# Patient Record
Sex: MALE | Race: WHITE | NOT HISPANIC OR LATINO | Employment: UNEMPLOYED | URBAN - NONMETROPOLITAN AREA
[De-identification: names, ages, dates, MRNs, and addresses within clinical notes are randomized per-mention and may not be internally consistent; named-entity substitution may affect disease eponyms.]

---

## 2017-01-25 ENCOUNTER — APPOINTMENT (OUTPATIENT)
Dept: GENERAL RADIOLOGY | Facility: HOSPITAL | Age: 52
End: 2017-01-25

## 2017-01-25 ENCOUNTER — HOSPITAL ENCOUNTER (EMERGENCY)
Facility: HOSPITAL | Age: 52
Discharge: HOME | End: 2017-01-26
Attending: INTERNAL MEDICINE | Admitting: INTERNAL MEDICINE

## 2017-01-25 DIAGNOSIS — R07.9 CHEST PAIN, UNSPECIFIED TYPE: Primary | ICD-10-CM

## 2017-01-25 PROCEDURE — 99284 EMERGENCY DEPT VISIT MOD MDM: CPT

## 2017-01-25 PROCEDURE — 93005 ELECTROCARDIOGRAM TRACING: CPT | Performed by: INTERNAL MEDICINE

## 2017-01-25 PROCEDURE — 71010 HC CHEST PA OR AP: CPT

## 2017-01-25 PROCEDURE — 71010 XR CHEST 1 VW: CPT | Performed by: RADIOLOGY

## 2017-01-25 PROCEDURE — 93010 ELECTROCARDIOGRAM REPORT: CPT | Performed by: INTERNAL MEDICINE

## 2017-01-25 RX ORDER — SODIUM CHLORIDE 0.9 % (FLUSH) 0.9 %
10 SYRINGE (ML) INJECTION AS NEEDED
Status: DISCONTINUED | OUTPATIENT
Start: 2017-01-25 | End: 2017-01-26 | Stop reason: HOSPADM

## 2017-01-25 RX ORDER — ATORVASTATIN CALCIUM 40 MG/1
80 TABLET, FILM COATED ORAL ONCE
Status: COMPLETED | OUTPATIENT
Start: 2017-01-25 | End: 2017-01-25

## 2017-01-25 RX ORDER — ASPIRIN 325 MG
325 TABLET ORAL ONCE
Status: COMPLETED | OUTPATIENT
Start: 2017-01-25 | End: 2017-01-25

## 2017-01-25 RX ORDER — ASPIRIN 81 MG/1
324 TABLET, CHEWABLE ORAL ONCE
Status: DISCONTINUED | OUTPATIENT
Start: 2017-01-25 | End: 2017-01-26 | Stop reason: HOSPADM

## 2017-01-25 RX ADMIN — ATORVASTATIN CALCIUM 80 MG: 40 TABLET, FILM COATED ORAL at 23:59

## 2017-01-25 RX ADMIN — Medication 325 MG: at 23:59

## 2017-01-26 VITALS
BODY MASS INDEX: 27.82 KG/M2 | RESPIRATION RATE: 18 BRPM | DIASTOLIC BLOOD PRESSURE: 78 MMHG | SYSTOLIC BLOOD PRESSURE: 118 MMHG | HEIGHT: 63 IN | HEART RATE: 89 BPM | WEIGHT: 157 LBS | OXYGEN SATURATION: 99 % | TEMPERATURE: 98.1 F

## 2017-01-26 LAB
ALBUMIN SERPL-MCNC: 4.1 G/DL (ref 3.5–5)
ALBUMIN/GLOB SERPL: 1.2 G/DL (ref 1.5–2.5)
ALP SERPL-CCNC: 96 U/L (ref 46–116)
ALT SERPL W P-5'-P-CCNC: 31 U/L (ref 10–44)
ANION GAP SERPL CALCULATED.3IONS-SCNC: 8.9 MMOL/L (ref 3.6–11.2)
AST SERPL-CCNC: 21 U/L (ref 10–34)
BASOPHILS # BLD AUTO: 0.03 10*3/MM3 (ref 0–0.3)
BASOPHILS NFR BLD AUTO: 0.3 % (ref 0–2)
BILIRUB SERPL-MCNC: 0.3 MG/DL (ref 0.2–1.8)
BUN BLD-MCNC: 14 MG/DL (ref 7–21)
BUN/CREAT SERPL: 10 (ref 7–25)
CALCIUM SPEC-SCNC: 9.2 MG/DL (ref 7.7–10)
CHLORIDE SERPL-SCNC: 106 MMOL/L (ref 99–112)
CO2 SERPL-SCNC: 26.1 MMOL/L (ref 24.3–31.9)
CREAT BLD-MCNC: 1.4 MG/DL (ref 0.43–1.29)
DEPRECATED RDW RBC AUTO: 40.7 FL (ref 37–54)
EOSINOPHIL # BLD AUTO: 0.09 10*3/MM3 (ref 0–0.7)
EOSINOPHIL NFR BLD AUTO: 1 % (ref 0–5)
ERYTHROCYTE [DISTWIDTH] IN BLOOD BY AUTOMATED COUNT: 12.1 % (ref 11.5–14.5)
GFR SERPL CREATININE-BSD FRML MDRD: 54 ML/MIN/1.73
GLOBULIN UR ELPH-MCNC: 3.3 GM/DL
GLUCOSE BLD-MCNC: 112 MG/DL (ref 70–110)
HCT VFR BLD AUTO: 42.7 % (ref 42–52)
HGB BLD-MCNC: 14 G/DL (ref 14–18)
HOLD SPECIMEN: NORMAL
IMM GRANULOCYTES # BLD: 0.01 10*3/MM3 (ref 0–0.03)
IMM GRANULOCYTES NFR BLD: 0.1 % (ref 0–0.5)
INR PPP: 0.92 (ref 0.8–1.1)
LYMPHOCYTES # BLD AUTO: 2.4 10*3/MM3 (ref 1–3)
LYMPHOCYTES NFR BLD AUTO: 26.8 % (ref 21–51)
MCH RBC QN AUTO: 30.9 PG (ref 27–33)
MCHC RBC AUTO-ENTMCNC: 32.8 G/DL (ref 33–37)
MCV RBC AUTO: 94.3 FL (ref 80–94)
MONOCYTES # BLD AUTO: 0.9 10*3/MM3 (ref 0.1–0.9)
MONOCYTES NFR BLD AUTO: 10 % (ref 0–10)
NEUTROPHILS # BLD AUTO: 5.53 10*3/MM3 (ref 1.4–6.5)
NEUTROPHILS NFR BLD AUTO: 61.8 % (ref 30–70)
OSMOLALITY SERPL CALC.SUM OF ELEC: 282.5 MOSM/KG (ref 273–305)
PLATELET # BLD AUTO: 249 10*3/MM3 (ref 130–400)
PMV BLD AUTO: 10.1 FL (ref 6–10)
POTASSIUM BLD-SCNC: 3.8 MMOL/L (ref 3.5–5.3)
PROT SERPL-MCNC: 7.4 G/DL (ref 6–8)
PROTHROMBIN TIME: 10.4 SECONDS (ref 9.8–11.9)
RBC # BLD AUTO: 4.53 10*6/MM3 (ref 4.7–6.1)
SODIUM BLD-SCNC: 141 MMOL/L (ref 135–153)
TROPONIN I SERPL-MCNC: <0.006 NG/ML
TROPONIN I SERPL-MCNC: <0.006 NG/ML
WBC NRBC COR # BLD: 8.96 10*3/MM3 (ref 4.5–12.5)
WHOLE BLOOD HOLD SPECIMEN: NORMAL
WHOLE BLOOD HOLD SPECIMEN: NORMAL

## 2017-01-26 PROCEDURE — 84484 ASSAY OF TROPONIN QUANT: CPT | Performed by: INTERNAL MEDICINE

## 2017-01-26 PROCEDURE — 85025 COMPLETE CBC W/AUTO DIFF WBC: CPT | Performed by: INTERNAL MEDICINE

## 2017-01-26 PROCEDURE — 36415 COLL VENOUS BLD VENIPUNCTURE: CPT

## 2017-01-26 PROCEDURE — 96360 HYDRATION IV INFUSION INIT: CPT

## 2017-01-26 PROCEDURE — 80053 COMPREHEN METABOLIC PANEL: CPT | Performed by: INTERNAL MEDICINE

## 2017-01-26 PROCEDURE — 85610 PROTHROMBIN TIME: CPT | Performed by: INTERNAL MEDICINE

## 2017-01-26 RX ORDER — ATORVASTATIN CALCIUM 20 MG/1
20 TABLET, FILM COATED ORAL DAILY
COMMUNITY
End: 2017-04-26 | Stop reason: SDUPTHER

## 2017-01-26 RX ORDER — ASPIRIN 81 MG/1
81 TABLET, CHEWABLE ORAL DAILY
COMMUNITY
End: 2020-02-24

## 2017-01-26 RX ORDER — LISINOPRIL 5 MG/1
10 TABLET ORAL DAILY
COMMUNITY
End: 2017-04-26 | Stop reason: SDUPTHER

## 2017-01-26 RX ORDER — OMEPRAZOLE 40 MG/1
40 CAPSULE, DELAYED RELEASE ORAL DAILY
COMMUNITY

## 2017-01-26 RX ADMIN — SODIUM CHLORIDE 1000 ML: 9 INJECTION, SOLUTION INTRAVENOUS at 03:39

## 2017-01-26 RX ADMIN — Medication 10 ML: at 03:42

## 2017-01-26 RX ADMIN — Medication 10 ML: at 00:23

## 2017-01-26 NOTE — DISCHARGE INSTRUCTIONS
Mr. Morin you underwent an extensive evaluation for symptomatic coronary disease back in August.  Your evaluation today was reassuring, and your EKG showed no evidence of heart injury or lack of blood supply.  I have consulted Dr. Sorto cardiologist and he feels that you can follow-up with Dr. Richardson, and you should call and make an appointment to be seen within the next week.  If you have recurrent or worsening symptoms please follow-up in the emergency department in the interim.

## 2017-01-26 NOTE — ED PROVIDER NOTES
Subjective   HPI Comments: This is a 81-year-old male he reports a history of known coronary disease.  He reports 2 previous myocardial infarctions.  He reports no intervention has been done to date.  He was medically managed.  Most recent MI he reports was in October of last year at [facility] in [place].  Her currently attempting to obtain records with regards to this admission.    Even so the patient approximately one hour before presentation began to experience left anterior chest pressure that was severe 10 out of 10, consistent with previous anginal symptoms.  The pain persisted until the patient was given a nitroglycerin which terminated the pain the patient has not had recurrent chest pain at this time.    Patient is a 51 y.o. male presenting with chest pain.   History provided by:  Police and patient   used: No    Chest Pain   Pain location:  L lateral chest  Pain quality: pressure    Pain radiates to:  Does not radiate  Pain severity:  Severe  Onset quality:  Gradual  Duration:  15 minutes  Timing:  Constant  Associated symptoms: no cough, no fever and no palpitations        Review of Systems   Constitutional: Negative for chills and fever.   Respiratory: Positive for chest tightness. Negative for apnea and cough.    Cardiovascular: Positive for chest pain. Negative for palpitations and leg swelling.   Endocrine: Negative for polydipsia.   Genitourinary: Negative for difficulty urinating.   Neurological: Negative for light-headedness.       Past Medical History   Diagnosis Date   • Myocardial infarction        Allergies   Allergen Reactions   • Bee Venom        History reviewed. No pertinent past surgical history.    History reviewed. No pertinent family history.    Social History     Social History   • Marital status:      Spouse name: N/A   • Number of children: N/A   • Years of education: N/A     Social History Main Topics   • Smoking status: Current Every Day Smoker      Packs/day: 1.00   • Smokeless tobacco: None   • Alcohol use No   • Drug use: No   • Sexual activity: Defer     Other Topics Concern   • None     Social History Narrative   • None           Objective   Physical Exam   Constitutional: He appears well-developed.   HENT:   Head: Normocephalic.   Eyes: Pupils are equal, round, and reactive to light.   Neck: Normal range of motion.   Cardiovascular: Normal rate.    Pulmonary/Chest: Effort normal.   Abdominal: Soft. Bowel sounds are normal.   Neurological: He is alert.   Psychiatric: He has a normal mood and affect.   Nursing note and vitals reviewed.      Procedures         ED Course  ED Course   Value Comment By Time    The EKG at 2321 hrs. this night shows a normal sinus mechanism with out ST-T wave changes.  There are no diagnostic changes on this EKG. Ramez Santiago MD 01/25 0621    Medical records from [facility] from an admission in August reveal an admission for chest pain.  The patient was ruled out for acute MI by serial biomarkers.  He underwent a treadmill exercise test which was low risk.  The patient was offered aggressive risk factor modification and dismissed.  It appears that the patient has not had recurrent chest pain since that time frame. Ramez Santiago MD 01/26 0029    Strategy will be to attempt a four-hour rule out for the patient and talk to the patient's cardiologist. Ramez Santiago MD 01/26 0105   Creatinine: (!) 1.40 (Reviewed) Ramez Santiago MD 01/26 0321   Creatinine: (!) 1.40 (Reviewed) Ramez Santiago MD 01/26 0321    I had the opportunity to discuss the case with Dr. Sorto, I related the fact that the patient had a nuclear medicine stress test which I have a copy of in front of me which was low risk factor the patient had no EKG changes his head to negative troponins.  Dr. Sorto feels that A and acute coronary syndrome is unlikely and is low risk.  He feels the patient can follow back up with his cardiologist of record within  the next 7-10 days. Ramez Santiago MD 01/26 0419                  [facility]    Final diagnoses:   Chest pain, unspecified type            Ramez Santiago MD  01/26/17 0424

## 2017-02-13 ENCOUNTER — HOSPITAL ENCOUNTER (EMERGENCY)
Facility: HOSPITAL | Age: 52
Discharge: HOME | End: 2017-02-13
Admitting: FAMILY MEDICINE

## 2017-02-13 ENCOUNTER — APPOINTMENT (OUTPATIENT)
Dept: GENERAL RADIOLOGY | Facility: HOSPITAL | Age: 52
End: 2017-02-13

## 2017-02-13 VITALS
DIASTOLIC BLOOD PRESSURE: 69 MMHG | HEART RATE: 75 BPM | SYSTOLIC BLOOD PRESSURE: 109 MMHG | HEIGHT: 63 IN | OXYGEN SATURATION: 98 % | WEIGHT: 153 LBS | RESPIRATION RATE: 18 BRPM | BODY MASS INDEX: 27.11 KG/M2 | TEMPERATURE: 97.8 F

## 2017-02-13 DIAGNOSIS — R07.9 CHEST PAIN, UNSPECIFIED TYPE: Primary | ICD-10-CM

## 2017-02-13 LAB
ALBUMIN SERPL-MCNC: 4.2 G/DL (ref 3.5–5)
ALBUMIN/GLOB SERPL: 1.3 G/DL (ref 1.5–2.5)
ALP SERPL-CCNC: 99 U/L (ref 46–116)
ALT SERPL W P-5'-P-CCNC: 34 U/L (ref 10–44)
ANION GAP SERPL CALCULATED.3IONS-SCNC: 6.8 MMOL/L (ref 3.6–11.2)
AST SERPL-CCNC: 24 U/L (ref 10–34)
BASOPHILS # BLD AUTO: 0.03 10*3/MM3 (ref 0–0.3)
BASOPHILS NFR BLD AUTO: 0.4 % (ref 0–2)
BILIRUB SERPL-MCNC: 0.3 MG/DL (ref 0.2–1.8)
BUN BLD-MCNC: 17 MG/DL (ref 7–21)
BUN/CREAT SERPL: 13 (ref 7–25)
CALCIUM SPEC-SCNC: 9.5 MG/DL (ref 7.7–10)
CHLORIDE SERPL-SCNC: 105 MMOL/L (ref 99–112)
CO2 SERPL-SCNC: 27.2 MMOL/L (ref 24.3–31.9)
CREAT BLD-MCNC: 1.31 MG/DL (ref 0.43–1.29)
DEPRECATED RDW RBC AUTO: 39.7 FL (ref 37–54)
EOSINOPHIL # BLD AUTO: 0.14 10*3/MM3 (ref 0–0.7)
EOSINOPHIL NFR BLD AUTO: 1.7 % (ref 0–5)
ERYTHROCYTE [DISTWIDTH] IN BLOOD BY AUTOMATED COUNT: 12.1 % (ref 11.5–14.5)
ETHANOL BLD-MCNC: <10 MG/DL
ETHANOL UR QL: <0.01 %
GFR SERPL CREATININE-BSD FRML MDRD: 58 ML/MIN/1.73
GLOBULIN UR ELPH-MCNC: 3.3 GM/DL
GLUCOSE BLD-MCNC: 99 MG/DL (ref 70–110)
HCT VFR BLD AUTO: 40.9 % (ref 42–52)
HGB BLD-MCNC: 13.9 G/DL (ref 14–18)
IMM GRANULOCYTES # BLD: 0.02 10*3/MM3 (ref 0–0.03)
IMM GRANULOCYTES NFR BLD: 0.2 % (ref 0–0.5)
LYMPHOCYTES # BLD AUTO: 3.12 10*3/MM3 (ref 1–3)
LYMPHOCYTES NFR BLD AUTO: 38.2 % (ref 21–51)
MCH RBC QN AUTO: 31.2 PG (ref 27–33)
MCHC RBC AUTO-ENTMCNC: 34 G/DL (ref 33–37)
MCV RBC AUTO: 91.7 FL (ref 80–94)
MONOCYTES # BLD AUTO: 1.08 10*3/MM3 (ref 0.1–0.9)
MONOCYTES NFR BLD AUTO: 13.2 % (ref 0–10)
NEUTROPHILS # BLD AUTO: 3.77 10*3/MM3 (ref 1.4–6.5)
NEUTROPHILS NFR BLD AUTO: 46.3 % (ref 30–70)
OSMOLALITY SERPL CALC.SUM OF ELEC: 279.1 MOSM/KG (ref 273–305)
PLATELET # BLD AUTO: 220 10*3/MM3 (ref 130–400)
PMV BLD AUTO: 9.9 FL (ref 6–10)
POTASSIUM BLD-SCNC: 3.8 MMOL/L (ref 3.5–5.3)
PROT SERPL-MCNC: 7.5 G/DL (ref 6–8)
RBC # BLD AUTO: 4.46 10*6/MM3 (ref 4.7–6.1)
SODIUM BLD-SCNC: 139 MMOL/L (ref 135–153)
TROPONIN I SERPL-MCNC: <0.006 NG/ML
TROPONIN I SERPL-MCNC: <0.006 NG/ML
WBC NRBC COR # BLD: 8.16 10*3/MM3 (ref 4.5–12.5)

## 2017-02-13 PROCEDURE — 36415 COLL VENOUS BLD VENIPUNCTURE: CPT

## 2017-02-13 PROCEDURE — 80307 DRUG TEST PRSMV CHEM ANLYZR: CPT | Performed by: NURSE PRACTITIONER

## 2017-02-13 PROCEDURE — 71010 XR CHEST 1 VW: CPT | Performed by: RADIOLOGY

## 2017-02-13 PROCEDURE — 96360 HYDRATION IV INFUSION INIT: CPT

## 2017-02-13 PROCEDURE — 85025 COMPLETE CBC W/AUTO DIFF WBC: CPT | Performed by: NURSE PRACTITIONER

## 2017-02-13 PROCEDURE — 99284 EMERGENCY DEPT VISIT MOD MDM: CPT

## 2017-02-13 PROCEDURE — 84484 ASSAY OF TROPONIN QUANT: CPT | Performed by: NURSE PRACTITIONER

## 2017-02-13 PROCEDURE — 71010 HC CHEST PA OR AP: CPT

## 2017-02-13 PROCEDURE — 93010 ELECTROCARDIOGRAM REPORT: CPT | Performed by: INTERNAL MEDICINE

## 2017-02-13 PROCEDURE — 93005 ELECTROCARDIOGRAM TRACING: CPT | Performed by: NURSE PRACTITIONER

## 2017-02-13 PROCEDURE — 80053 COMPREHEN METABOLIC PANEL: CPT | Performed by: NURSE PRACTITIONER

## 2017-02-13 RX ORDER — SODIUM CHLORIDE 0.9 % (FLUSH) 0.9 %
10 SYRINGE (ML) INJECTION AS NEEDED
Status: DISCONTINUED | OUTPATIENT
Start: 2017-02-13 | End: 2017-02-13 | Stop reason: HOSPADM

## 2017-02-13 RX ORDER — ASPIRIN 81 MG/1
324 TABLET, CHEWABLE ORAL ONCE
Status: COMPLETED | OUTPATIENT
Start: 2017-02-13 | End: 2017-02-13

## 2017-02-13 RX ADMIN — SODIUM CHLORIDE 1000 ML: 9 INJECTION, SOLUTION INTRAVENOUS at 02:05

## 2017-02-13 RX ADMIN — ASPIRIN 324 MG: 81 TABLET, CHEWABLE ORAL at 01:10

## 2017-02-13 NOTE — ED PROVIDER NOTES
Subjective   Patient is a 51 y.o. male presenting with chest pain.   History provided by:  Patient   used: No    Chest Pain   Pain location:  R chest  Pain quality: aching and stabbing    Pain radiates to:  Does not radiate  Pain severity:  Moderate  Onset quality:  Sudden  Duration:  2 hours  Progression:  Partially resolved  Chronicity:  New  Context: at rest    Context: not breathing, not drug use, not intercourse, not lifting, not stress and not trauma    Relieved by:  Nothing  Worsened by:  Nothing  Ineffective treatments:  None tried  Associated symptoms: no abdominal pain, no AICD problem, no altered mental status, no anorexia, no anxiety, no back pain, no claudication, no dizziness, no dysphagia, no fatigue, no fever, no headache, no numbness, no orthopnea, no palpitations and no PND    Risk factors: coronary artery disease, high cholesterol, hypertension, male sex and smoking        Review of Systems   Constitutional: Negative.  Negative for fatigue and fever.   HENT: Negative.  Negative for trouble swallowing.    Eyes: Negative.    Respiratory: Negative.    Cardiovascular: Positive for chest pain. Negative for palpitations, orthopnea, claudication and PND.   Gastrointestinal: Negative.  Negative for abdominal pain and anorexia.   Endocrine: Negative.    Genitourinary: Negative.    Musculoskeletal: Negative.  Negative for back pain.   Skin: Negative.    Allergic/Immunologic: Negative.    Neurological: Negative for dizziness, numbness and headaches.   Hematological: Negative.    Psychiatric/Behavioral: Negative.    All other systems reviewed and are negative.      Past Medical History   Diagnosis Date   • Myocardial infarction        Allergies   Allergen Reactions   • Bee Venom        History reviewed. No pertinent past surgical history.    History reviewed. No pertinent family history.    Social History     Social History   • Marital status:      Spouse name: N/A   • Number of  children: N/A   • Years of education: N/A     Social History Main Topics   • Smoking status: Current Every Day Smoker     Packs/day: 1.00   • Smokeless tobacco: None   • Alcohol use No   • Drug use: No   • Sexual activity: Defer     Other Topics Concern   • None     Social History Narrative           Objective   Physical Exam   Constitutional: He is oriented to person, place, and time. He appears well-developed and well-nourished.   HENT:   Head: Normocephalic and atraumatic.   Nose: Nose normal.   Mouth/Throat: Oropharynx is clear and moist.   Eyes: EOM are normal. Pupils are equal, round, and reactive to light.   Neck: Normal range of motion.   Cardiovascular: Normal rate, regular rhythm, normal heart sounds and intact distal pulses.    Pulmonary/Chest: Effort normal and breath sounds normal.   Abdominal: Soft. Bowel sounds are normal.   Musculoskeletal: Normal range of motion.   Neurological: He is alert and oriented to person, place, and time.   Skin: Skin is warm and dry.   Psychiatric: He has a normal mood and affect. His behavior is normal. Judgment and thought content normal.   Nursing note and vitals reviewed.      Procedures         ED Course  ED Course   Value Comment By Time   XR Chest 1 View No acute cardiopulmonary disease Radha De La Rosa, APRN 02/13 0328    Patient reports having a negative cardiac stress test in October 2016.  Radha De La Rosa, APRN 02/13 0343            HEART Score  History: Moderately suspicious (+1)  ECG: Normal (+0)  Age: 45 through 65 (+1)  Risk Factors: 3 or more risk factors OR history of atherosclerotic disease (+2)  Troponin: Normal limit or lower (+0)  Total: 4         MDM  Number of Diagnoses or Management Options  Chest pain, unspecified type: new and requires workup     Amount and/or Complexity of Data Reviewed  Clinical lab tests: ordered and reviewed  Tests in the radiology section of CPT®: reviewed and ordered  Tests in the medicine section of CPT®:  reviewed and ordered  Decide to obtain previous medical records or to obtain history from someone other than the patient: yes    Risk of Complications, Morbidity, and/or Mortality  Presenting problems: moderate  Diagnostic procedures: moderate  Management options: moderate    Patient Progress  Patient progress: improved      Final diagnoses:   Chest pain, unspecified type            Radha De La Rosa, APRN  02/13/17 0344

## 2017-03-21 ENCOUNTER — OFFICE VISIT (OUTPATIENT)
Dept: CARDIOLOGY | Facility: CLINIC | Age: 52
End: 2017-03-21

## 2017-03-21 VITALS
WEIGHT: 151 LBS | RESPIRATION RATE: 16 BRPM | HEART RATE: 82 BPM | DIASTOLIC BLOOD PRESSURE: 76 MMHG | HEIGHT: 63 IN | OXYGEN SATURATION: 95 % | BODY MASS INDEX: 26.75 KG/M2 | SYSTOLIC BLOOD PRESSURE: 103 MMHG

## 2017-03-21 DIAGNOSIS — E78.5 DYSLIPIDEMIA: ICD-10-CM

## 2017-03-21 DIAGNOSIS — R07.2 PRECORDIAL PAIN: Primary | ICD-10-CM

## 2017-03-21 DIAGNOSIS — I10 ESSENTIAL HYPERTENSION: ICD-10-CM

## 2017-03-21 PROCEDURE — 99204 OFFICE O/P NEW MOD 45 MIN: CPT | Performed by: INTERNAL MEDICINE

## 2017-03-21 PROCEDURE — 93000 ELECTROCARDIOGRAM COMPLETE: CPT | Performed by: INTERNAL MEDICINE

## 2017-03-21 NOTE — PROGRESS NOTES
Vincent Arzola DO  Asif Morin  : 1965  DATE:2017    Patient Active Problem List   Diagnosis   • Essential hypertension   • Dyslipidemia       Dear Vincent Arzola DO:    Subjective     Asif Morin is a 51 y.o. male with the above medical problems who is being seen for consultation today at the request of Vincent Arzola DO.  According to the patient he has been having chest pain for the last few weeks.  He says the chest pain usually located over his left chest, both sharp and oppressive in nature and radiates down his left arm.  He states the pain lasts 5-10 minutes.  He denies any associated symptoms.  He states is worsened by nothing but relieved by nitroglycerin.  He states he also can be both during stress and rest.  He states the intensity is 9/10 on the pain scale.  The patient has an extensive history of smoking but quit smoking 20 years ago.  He continues to chew tobacco.  He denies any alcohol or illicit drug use.  He is also complaining of dyspnea but denies any palpitations, orthopnea, PND, lower extremity edema, syncope or dizziness.  He has a history of COPD due to his history of tobacco use.      Past Medical History   Diagnosis Date   • COPD (chronic obstructive pulmonary disease)    • Hyperlipidemia    • Hypertension    • Myocardial infarction    • Stroke        Past Surgical History   Procedure Laterality Date   • Skin cancer excision       left elbow       Family History   Problem Relation Age of Onset   • No Known Problems Mother    • No Known Problems Father        Social History     Social History   • Marital status:      Spouse name: N/A   • Number of children: N/A   • Years of education: N/A     Occupational History   • Not on file.     Social History Main Topics   • Smoking status: Current Every Day Smoker     Packs/day: 1.00   • Smokeless tobacco: Not on file   • Alcohol use No   • Drug use: No   • Sexual activity: Defer     Other  Topics Concern   • Not on file     Social History Narrative         Current Outpatient Prescriptions:   •  aspirin 81 MG chewable tablet, Chew 81 mg Daily., Disp: , Rfl:   •  atorvastatin (LIPITOR) 20 MG tablet, Take 20 mg by mouth Daily., Disp: , Rfl:   •  cyclobenzaprine (FLEXERIL) 10 MG tablet, Take 1 tablet by mouth 3 (three) times a day as needed for muscle spasms., Disp: 20 tablet, Rfl: 0  •  ibuprofen (ADVIL,MOTRIN) 800 MG tablet, Take 1 tablet by mouth every 6 (six) hours as needed for mild pain (1-3)., Disp: 30 tablet, Rfl: 0  •  lisinopril (PRINIVIL,ZESTRIL) 5 MG tablet, Take 10 mg by mouth Daily., Disp: , Rfl:   •  omeprazole (priLOSEC) 20 MG capsule, Take 20 mg by mouth Daily., Disp: , Rfl:     The following portions of the patient's history were reviewed and updated as appropriate: allergies, current medications, past family history, past medical history, past social history, past surgical history and problem list.    Review of Systems   Constitution: Negative for chills, fever and malaise/fatigue.   HENT: Positive for nosebleeds and sore throat. Negative for congestion and headaches.    Eyes: Positive for blurred vision. Negative for double vision, pain and redness.        Wears readers only     Cardiovascular: Positive for chest pain (left sided , dull ache x 2 wks.  comes and goes) and dyspnea on exertion. Negative for leg swelling, near-syncope, orthopnea, palpitations, paroxysmal nocturnal dyspnea and syncope.   Respiratory: Positive for cough and shortness of breath. Negative for hemoptysis and wheezing.    Endocrine: Negative for cold intolerance and heat intolerance.   Hematologic/Lymphatic: Bruises/bleeds easily (bleed).   Skin: Positive for dry skin. Negative for rash.   Musculoskeletal: Positive for arthritis, back pain, joint pain (bilat elbows), myalgias and stiffness.   Gastrointestinal: Negative for abdominal pain, change in bowel habit, constipation, diarrhea, heartburn, hematemesis,  "hematochezia, melena, nausea and vomiting.   Genitourinary: Negative for dysuria and frequency.   Neurological: Positive for numbness (bilat legs). Negative for dizziness.   Psychiatric/Behavioral: Positive for depression. The patient is nervous/anxious.        Objective   Blood pressure 103/76, pulse 82, resp. rate 16, height 63\" (160 cm), weight 151 lb (68.5 kg), SpO2 95 %.    Physical Exam   Constitutional: He is oriented to person, place, and time. He appears well-developed and well-nourished.   WM sitting comfortably in chair. He is currently in half-way and was brought in chairs   HENT:   Mouth/Throat: Oropharynx is clear and moist.   Eyes: EOM are normal. Pupils are equal, round, and reactive to light.   Neck: Neck supple. No JVD present. No tracheal deviation present. No thyromegaly present.   Cardiovascular: Normal rate, regular rhythm, S1 normal and S2 normal.  Exam reveals no gallop and no friction rub.    No murmur heard.  Pulmonary/Chest: Effort normal and breath sounds normal. No respiratory distress. He has no wheezes. He has no rales.   Abdominal: Soft. Bowel sounds are normal. He exhibits no mass. There is no tenderness.   Musculoskeletal: Normal range of motion. He exhibits no edema.   Lymphadenopathy:     He has no cervical adenopathy.   Neurological: He is alert and oriented to person, place, and time.   Skin: Skin is warm and dry. No rash noted.   Psychiatric: He has a normal mood and affect.         ECG 12 Lead  Date/Time: 3/21/2017 10:10 AM  Performed by: FRANCOISE MACHADO  Authorized by: FRANCOISE MACHADO   Comparison: compared with previous ECG from 2/13/2017  Similar to previous ECG  Rhythm: sinus rhythm  Rate: normal  BPM: 76  Conduction: conduction normal  ST Segments: ST segments normal  T Waves: T waves normal  QRS axis: normal  Other: no other findings  Clinical impression: normal ECG            Assessment/Plan      1.  Chest pain: Patient with chest pain that is " concerning for coronary artery disease.  He has multiple risk factors for this condition including age, sex, hypertension, dyslipidemia and tobacco use.  At this point will need to evaluate further with stress test.  According to the patient is unable to walk on a treadmill for 10 minutes due to knee and back pain.  As point we'll request a nuclear stress test.  We'll also request an echocardiogram to evaluate LV function and wall motion.  We'll need a lipid panel to evaluate for dyslipidemia.    2.  Hypertension: Patient with a history of hypertension that appears to be well-controlled on current regimen.  At this point will continue.  The patient's renal function appears abnormal.  Will need to monitor closely and consider switching lisinopril for an alternative medication.    3.  Dyslipidemia: Patient with history of dyslipidemia currently on atorvastatin.  We will need to check lipid panel.       Diagnosis Plan   1. Precordial pain  ECG 12 Lead    Stress Test With Myocardial Perfusion    Adult Transthoracic Echo Complete    Lipid Panel   2. Essential hypertension     3. Dyslipidemia              Return in about 1 month (around 4/21/2017).    I appreciate the opportunity to participate in this patient's cardiovascular care.    Best Regards    Ozzy Brooks

## 2017-03-28 ENCOUNTER — HOSPITAL ENCOUNTER (OUTPATIENT)
Dept: CARDIOLOGY | Facility: HOSPITAL | Age: 52
Discharge: HOME | End: 2017-03-28
Attending: INTERNAL MEDICINE | Admitting: INTERNAL MEDICINE

## 2017-03-28 DIAGNOSIS — R07.2 PRECORDIAL PAIN: ICD-10-CM

## 2017-03-28 PROCEDURE — 93306 TTE W/DOPPLER COMPLETE: CPT | Performed by: INTERNAL MEDICINE

## 2017-03-28 PROCEDURE — 93306 TTE W/DOPPLER COMPLETE: CPT

## 2017-03-30 LAB
BH CV ECHO MEAS - % IVS THICK: 52.6 %
BH CV ECHO MEAS - % LVPW THICK: 68.4 %
BH CV ECHO MEAS - ACS: 1.7 CM
BH CV ECHO MEAS - AO ROOT AREA (BSA CORRECTED): 1.6
BH CV ECHO MEAS - AO ROOT AREA: 5.9 CM^2
BH CV ECHO MEAS - AO ROOT DIAM: 2.7 CM
BH CV ECHO MEAS - BSA(HAYCOCK): 1.8 M^2
BH CV ECHO MEAS - BSA: 1.7 M^2
BH CV ECHO MEAS - BZI_BMI: 26.7 KILOGRAMS/M^2
BH CV ECHO MEAS - BZI_METRIC_HEIGHT: 160 CM
BH CV ECHO MEAS - BZI_METRIC_WEIGHT: 68.5 KG
BH CV ECHO MEAS - CONTRAST EF 4CH: 65.9 ML/M^2
BH CV ECHO MEAS - EDV(CUBED): 120.6 ML
BH CV ECHO MEAS - EDV(MOD-SP4): 44 ML
BH CV ECHO MEAS - EDV(TEICH): 115 ML
BH CV ECHO MEAS - EF(CUBED): 70.4 %
BH CV ECHO MEAS - EF(MOD-SP4): 65.9 %
BH CV ECHO MEAS - EF(TEICH): 61.8 %
BH CV ECHO MEAS - ESV(CUBED): 35.7 ML
BH CV ECHO MEAS - ESV(MOD-SP4): 15 ML
BH CV ECHO MEAS - ESV(TEICH): 43.9 ML
BH CV ECHO MEAS - FS: 33.3 %
BH CV ECHO MEAS - IVS/LVPW: 1
BH CV ECHO MEAS - IVSD: 0.68 CM
BH CV ECHO MEAS - IVSS: 1 CM
BH CV ECHO MEAS - LA DIMENSION: 2.8 CM
BH CV ECHO MEAS - LA/AO: 1
BH CV ECHO MEAS - LV DIASTOLIC VOL/BSA (35-75): 25.6 ML/M^2
BH CV ECHO MEAS - LV MASS(C)D: 108.4 GRAMS
BH CV ECHO MEAS - LV MASS(C)DI: 63.2 GRAMS/M^2
BH CV ECHO MEAS - LV MASS(C)S: 107.6 GRAMS
BH CV ECHO MEAS - LV MASS(C)SI: 62.7 GRAMS/M^2
BH CV ECHO MEAS - LV SYSTOLIC VOL/BSA (12-30): 8.7 ML/M^2
BH CV ECHO MEAS - LVIDD: 4.9 CM
BH CV ECHO MEAS - LVIDS: 3.3 CM
BH CV ECHO MEAS - LVLD AP4: 7.1 CM
BH CV ECHO MEAS - LVLS AP4: 5.6 CM
BH CV ECHO MEAS - LVOT AREA (M): 2.3 CM^2
BH CV ECHO MEAS - LVOT AREA: 2.2 CM^2
BH CV ECHO MEAS - LVOT DIAM: 1.7 CM
BH CV ECHO MEAS - LVPWD: 0.68 CM
BH CV ECHO MEAS - LVPWS: 1.1 CM
BH CV ECHO MEAS - MV A MAX VEL: 53.3 CM/SEC
BH CV ECHO MEAS - MV E MAX VEL: 50.3 CM/SEC
BH CV ECHO MEAS - MV E/A: 0.94
BH CV ECHO MEAS - PA ACC SLOPE: 1566 CM/SEC^2
BH CV ECHO MEAS - PA ACC TIME: 0.06 SEC
BH CV ECHO MEAS - PA PR(ACCEL): 53.6 MMHG
BH CV ECHO MEAS - RAP SYSTOLE: 10 MMHG
BH CV ECHO MEAS - RVDD: 1.1 CM
BH CV ECHO MEAS - RVSP: 27 MMHG
BH CV ECHO MEAS - SI(CUBED): 49.5 ML/M^2
BH CV ECHO MEAS - SI(MOD-SP4): 16.9 ML/M^2
BH CV ECHO MEAS - SI(TEICH): 41.4 ML/M^2
BH CV ECHO MEAS - SV(CUBED): 84.9 ML
BH CV ECHO MEAS - SV(MOD-SP4): 29 ML
BH CV ECHO MEAS - SV(TEICH): 71.1 ML
BH CV ECHO MEAS - TR MAX VEL: 206.3 CM/SEC
LV EF 2D ECHO EST: 65 %

## 2017-04-26 RX ORDER — ATORVASTATIN CALCIUM 20 MG/1
20 TABLET, FILM COATED ORAL DAILY
Qty: 30 TABLET | Refills: 2 | Status: SHIPPED | OUTPATIENT
Start: 2017-04-26 | End: 2019-12-05

## 2017-04-26 RX ORDER — LISINOPRIL 5 MG/1
10 TABLET ORAL DAILY
Qty: 30 TABLET | Refills: 2 | Status: SHIPPED | OUTPATIENT
Start: 2017-04-26 | End: 2019-12-05 | Stop reason: ALTCHOICE

## 2017-05-01 ENCOUNTER — APPOINTMENT (OUTPATIENT)
Dept: NUCLEAR MEDICINE | Facility: HOSPITAL | Age: 52
End: 2017-05-01
Attending: INTERNAL MEDICINE

## 2017-05-01 ENCOUNTER — APPOINTMENT (OUTPATIENT)
Dept: CARDIOLOGY | Facility: HOSPITAL | Age: 52
End: 2017-05-01
Attending: INTERNAL MEDICINE

## 2017-05-12 ENCOUNTER — HOSPITAL ENCOUNTER (OUTPATIENT)
Dept: NUCLEAR MEDICINE | Facility: HOSPITAL | Age: 52
Discharge: HOME | End: 2017-05-12
Attending: INTERNAL MEDICINE

## 2017-05-12 ENCOUNTER — HOSPITAL ENCOUNTER (OUTPATIENT)
Dept: CARDIOLOGY | Facility: HOSPITAL | Age: 52
Discharge: HOME | End: 2017-05-12
Attending: INTERNAL MEDICINE

## 2017-05-12 VITALS — BODY MASS INDEX: 27.11 KG/M2 | HEIGHT: 63 IN | WEIGHT: 153 LBS

## 2017-05-12 DIAGNOSIS — R07.2 PRECORDIAL PAIN: ICD-10-CM

## 2017-05-12 LAB
BH CV NUCLEAR PRIOR STUDY: 3
BH CV STRESS BP STAGE 1: NORMAL
BH CV STRESS COMMENTS STAGE 1: NORMAL
BH CV STRESS DOSE REGADENOSON STAGE 1: 0.4
BH CV STRESS DURATION MIN STAGE 1: 0
BH CV STRESS DURATION SEC STAGE 1: 15
BH CV STRESS HR STAGE 1: 110
BH CV STRESS PROTOCOL 1: NORMAL
BH CV STRESS RECOVERY BP: NORMAL MMHG
BH CV STRESS RECOVERY HR: 58 BPM
BH CV STRESS STAGE 1: 1
LV EF NUC BP: 80 %
MAXIMAL PREDICTED HEART RATE: 170 BPM
PERCENT MAX PREDICTED HR: 64.71 %
STRESS BASELINE BP: NORMAL MMHG
STRESS BASELINE HR: 52 BPM
STRESS PERCENT HR: 76 %
STRESS POST PEAK BP: NORMAL MMHG
STRESS POST PEAK HR: 110 BPM
STRESS TARGET HR: 145 BPM

## 2017-05-12 PROCEDURE — 78452 HT MUSCLE IMAGE SPECT MULT: CPT | Performed by: INTERNAL MEDICINE

## 2017-05-12 PROCEDURE — 25010000002 REGADENOSON 0.4 MG/5ML SOLUTION: Performed by: INTERNAL MEDICINE

## 2017-05-12 PROCEDURE — 78451 HT MUSCLE IMAGE SPECT SING: CPT

## 2017-05-12 PROCEDURE — 93018 CV STRESS TEST I&R ONLY: CPT | Performed by: INTERNAL MEDICINE

## 2017-05-12 PROCEDURE — A9500 TC99M SESTAMIBI: HCPCS | Performed by: INTERNAL MEDICINE

## 2017-05-12 PROCEDURE — 93017 CV STRESS TEST TRACING ONLY: CPT

## 2017-05-12 PROCEDURE — 0 TECHNETIUM SESTAMIBI: Performed by: INTERNAL MEDICINE

## 2017-05-12 PROCEDURE — 25010000002 AMINOPHYLLINE PER 250 MG: Performed by: INTERNAL MEDICINE

## 2017-05-12 RX ORDER — AMINOPHYLLINE DIHYDRATE 25 MG/ML
125 INJECTION, SOLUTION INTRAVENOUS
Status: COMPLETED | OUTPATIENT
Start: 2017-05-12 | End: 2017-05-12

## 2017-05-12 RX ADMIN — Medication 1 DOSE: at 09:15

## 2017-05-12 RX ADMIN — AMINOPHYLLINE 125 MG: 25 INJECTION, SOLUTION INTRAVENOUS at 11:00

## 2017-05-12 RX ADMIN — REGADENOSON 0.4 MG: 0.08 INJECTION, SOLUTION INTRAVENOUS at 10:56

## 2017-05-12 RX ADMIN — Medication 1 DOSE: at 10:56

## 2018-02-01 ENCOUNTER — OFFICE VISIT (OUTPATIENT)
Dept: RETAIL CLINIC | Facility: CLINIC | Age: 53
End: 2018-02-01

## 2018-02-01 VITALS
TEMPERATURE: 98.5 F | SYSTOLIC BLOOD PRESSURE: 120 MMHG | HEIGHT: 63 IN | OXYGEN SATURATION: 97 % | WEIGHT: 168.4 LBS | BODY MASS INDEX: 29.84 KG/M2 | DIASTOLIC BLOOD PRESSURE: 78 MMHG | HEART RATE: 70 BPM | RESPIRATION RATE: 18 BRPM

## 2018-02-01 DIAGNOSIS — J02.9 SORE THROAT: Primary | ICD-10-CM

## 2018-02-01 LAB
EXPIRATION DATE: NORMAL
INTERNAL CONTROL: NORMAL
Lab: NORMAL
S PYO AG THROAT QL: NEGATIVE

## 2018-02-01 PROCEDURE — 99212 OFFICE O/P EST SF 10 MIN: CPT | Performed by: NURSE PRACTITIONER

## 2018-02-01 PROCEDURE — 87880 STREP A ASSAY W/OPTIC: CPT | Performed by: NURSE PRACTITIONER

## 2018-02-01 NOTE — PATIENT INSTRUCTIONS
Sore Throat  When you have a sore throat, your throat may:  · Hurt.  · Burn.  · Feel irritated.  · Feel scratchy.  Many things can cause a sore throat, including:  · An infection.  · Allergies.  · Dryness in the air.  · Smoke or pollution.  · Gastroesophageal reflux disease (GERD).  · A tumor.  A sore throat can be the first sign of another sickness. It can happen with other problems, like coughing or a fever. Most sore throats go away without treatment.  Follow these instructions at home:  · Take over-the-counter medicines only as told by your doctor.  · Drink enough fluids to keep your pee (urine) clear or pale yellow.  · Rest when you feel you need to.  · To help with pain, try:  ¨ Sipping warm liquids, such as broth, herbal tea, or warm water.  ¨ Eating or drinking cold or frozen liquids, such as frozen ice pops.  ¨ Gargling with a salt-water mixture 3-4 times a day or as needed. To make a salt-water mixture, add ½-1 tsp of salt in 1 cup of warm water. Mix it until you cannot see the salt anymore.  ¨ Sucking on hard candy or throat lozenges.  ¨ Putting a cool-mist humidifier in your bedroom at night.  ¨ Sitting in the bathroom with the door closed for 5-10 minutes while you run hot water in the shower.  · Do not use any tobacco products, such as cigarettes, chewing tobacco, and e-cigarettes. If you need help quitting, ask your doctor.  Contact a doctor if:  · You have a fever for more than 2-3 days.  · You keep having symptoms for more than 2-3 days.  · Your throat does not get better in 7 days.  · You have a fever and your symptoms suddenly get worse.  Get help right away if:  · You have trouble breathing.  · You cannot swallow fluids, soft foods, or your saliva.  · You have swelling in your throat or neck that gets worse.  · You keep feeling like you are going to throw up (vomit).  · You keep throwing up.  This information is not intended to replace advice given to you by your health care provider. Make sure  you discuss any questions you have with your health care provider.  Document Released: 09/26/2009 Document Revised: 08/13/2017 Document Reviewed: 10/07/2016  Elsevier Interactive Patient Education © 2017 [employer]

## 2018-02-01 NOTE — PROGRESS NOTES
"Subjective   Asif Morin is a 52 y.o. male.   Chief Complaint   Patient presents with   • Sore Throat      Sore Throat    This is a new problem. The current episode started today. The problem has been gradually improving. There has been no fever. The patient is experiencing no pain. Pertinent negatives include no congestion. Treatments tried: rest. The treatment provided moderate relief.        The following portions of the patient's history were reviewed and updated as appropriate: allergies, current medications, past family history, past medical history, past social history, past surgical history and problem list.    Current Outpatient Prescriptions:   •  aspirin 81 MG chewable tablet, Chew 81 mg Daily., Disp: , Rfl:   •  atorvastatin (LIPITOR) 20 MG tablet, Take 1 tablet by mouth Daily., Disp: 30 tablet, Rfl: 2  •  cyclobenzaprine (FLEXERIL) 10 MG tablet, Take 1 tablet by mouth 3 (three) times a day as needed for muscle spasms., Disp: 20 tablet, Rfl: 0  •  ibuprofen (ADVIL,MOTRIN) 800 MG tablet, Take 1 tablet by mouth every 6 (six) hours as needed for mild pain (1-3)., Disp: 30 tablet, Rfl: 0  •  lisinopril (PRINIVIL,ZESTRIL) 5 MG tablet, Take 2 tablets by mouth Daily., Disp: 30 tablet, Rfl: 2  •  omeprazole (priLOSEC) 20 MG capsule, Take 20 mg by mouth Daily., Disp: , Rfl:     Review of Systems   Constitutional: Negative.    HENT: Positive for sore throat and voice change. Negative for congestion, postnasal drip, rhinorrhea, sinus pain, sinus pressure, sneezing and tinnitus.    Eyes: Negative.    Respiratory: Negative.    Cardiovascular: Negative.    Gastrointestinal: Negative.    Musculoskeletal: Negative.    Skin: Negative for rash.     /78 (BP Location: Left arm, Patient Position: Sitting, Cuff Size: Adult)  Pulse 70  Temp 98.5 °F (36.9 °C) (Oral)   Resp 18  Ht 160 cm (62.99\")  Wt 76.4 kg (168 lb 6.4 oz)  SpO2 97%  BMI 29.84 kg/m2    Objective   Allergies   Allergen Reactions   • Bee " Venom        Physical Exam   Constitutional: He is oriented to person, place, and time. He appears well-developed and well-nourished. No distress.   HENT:   Head: Normocephalic.   Right Ear: External ear normal.   Left Ear: External ear normal.   Nose: Nose normal.   Mouth/Throat: Mucous membranes are normal. Posterior oropharyngeal erythema present. No oropharyngeal exudate, posterior oropharyngeal edema or tonsillar abscesses. Tonsils are 0 on the right. Tonsils are 0 on the left. No tonsillar exudate.   Eyes: Conjunctivae are normal.   Neck: Normal range of motion. Neck supple. No JVD present. No tracheal deviation present. No thyromegaly present.   Cardiovascular: Normal rate, regular rhythm and normal heart sounds.  Exam reveals no gallop and no friction rub.    No murmur heard.  Pulmonary/Chest: Effort normal and breath sounds normal. No stridor. No respiratory distress. He has no wheezes. He has no rales.   Abdominal: Soft. Bowel sounds are normal.   Lymphadenopathy:     He has no cervical adenopathy.   Neurological: He is alert and oriented to person, place, and time.   Skin: Skin is warm and dry. He is not diaphoretic.   Psychiatric: He has a normal mood and affect. His behavior is normal.   Vitals reviewed.      Assessment/Plan   Asif was seen today for sore throat.    Diagnoses and all orders for this visit:    Sore throat  -     POC Rapid Strep A      Results for orders placed or performed in visit on 02/01/18   POC Rapid Strep A   Result Value Ref Range    Rapid Strep A Screen Negative Negative, VALID, INVALID, Not Performed    Internal Control Passed Passed    Lot Number [id number]     Expiration Date 5/19           An After Visit Summary was printed, reviewed, and given to the patient. Understanding verbalized and agrees with treatment plan.  If no improvement or becomes worse, follow up with primary or go to [facility].          February 1, 2018 6:57 PM

## 2018-08-03 ENCOUNTER — LAB (OUTPATIENT)
Dept: LAB | Facility: HOSPITAL | Age: 53
End: 2018-08-03

## 2018-08-03 ENCOUNTER — TRANSCRIBE ORDERS (OUTPATIENT)
Dept: ADMINISTRATIVE | Facility: HOSPITAL | Age: 53
End: 2018-08-03

## 2018-08-03 DIAGNOSIS — Z79.899 ENCOUNTER FOR LONG-TERM (CURRENT) USE OF HIGH-RISK MEDICATION: ICD-10-CM

## 2018-08-03 DIAGNOSIS — I10 ESSENTIAL HYPERTENSION, BENIGN: ICD-10-CM

## 2018-08-03 DIAGNOSIS — Z12.5 SPECIAL SCREENING FOR MALIGNANT NEOPLASM OF PROSTATE: ICD-10-CM

## 2018-08-03 DIAGNOSIS — Z12.5 SPECIAL SCREENING FOR MALIGNANT NEOPLASM OF PROSTATE: Primary | ICD-10-CM

## 2018-08-03 DIAGNOSIS — E78.00 PURE HYPERCHOLESTEROLEMIA: ICD-10-CM

## 2018-08-03 LAB
ALBUMIN SERPL-MCNC: 4.1 G/DL (ref 3.5–5)
ALBUMIN UR-MCNC: 11 MG/L
ALBUMIN/GLOB SERPL: 1.3 G/DL (ref 1.5–2.5)
ALP SERPL-CCNC: 95 U/L (ref 40–129)
ALT SERPL W P-5'-P-CCNC: 21 U/L (ref 10–44)
ANION GAP SERPL CALCULATED.3IONS-SCNC: 4.6 MMOL/L (ref 3.6–11.2)
AST SERPL-CCNC: 24 U/L (ref 10–34)
BASOPHILS # BLD AUTO: 0.03 10*3/MM3 (ref 0–0.3)
BASOPHILS NFR BLD AUTO: 0.5 % (ref 0–2)
BILIRUB SERPL-MCNC: 0.5 MG/DL (ref 0.2–1.8)
BUN BLD-MCNC: 9 MG/DL (ref 7–21)
BUN/CREAT SERPL: 6.1 (ref 7–25)
CALCIUM SPEC-SCNC: 8.8 MG/DL (ref 7.7–10)
CHLORIDE SERPL-SCNC: 106 MMOL/L (ref 99–112)
CHOLEST SERPL-MCNC: 137 MG/DL (ref 0–200)
CO2 SERPL-SCNC: 26.4 MMOL/L (ref 24.3–31.9)
CREAT BLD-MCNC: 1.48 MG/DL (ref 0.43–1.29)
DEPRECATED RDW RBC AUTO: 41.3 FL (ref 37–54)
EOSINOPHIL # BLD AUTO: 0.18 10*3/MM3 (ref 0–0.7)
EOSINOPHIL NFR BLD AUTO: 2.9 % (ref 0–5)
ERYTHROCYTE [DISTWIDTH] IN BLOOD BY AUTOMATED COUNT: 12.2 % (ref 11.5–14.5)
GFR SERPL CREATININE-BSD FRML MDRD: 50 ML/MIN/1.73
GLOBULIN UR ELPH-MCNC: 3.1 GM/DL
GLUCOSE BLD-MCNC: 102 MG/DL (ref 70–110)
HCT VFR BLD AUTO: 38.5 % (ref 42–52)
HDLC SERPL-MCNC: 36 MG/DL (ref 60–100)
HGB BLD-MCNC: 13.1 G/DL (ref 14–18)
IMM GRANULOCYTES # BLD: 0.02 10*3/MM3 (ref 0–0.03)
IMM GRANULOCYTES NFR BLD: 0.3 % (ref 0–0.5)
LDLC SERPL CALC-MCNC: 65 MG/DL (ref 0–100)
LDLC/HDLC SERPL: 1.81 {RATIO}
LYMPHOCYTES # BLD AUTO: 1.51 10*3/MM3 (ref 1–3)
LYMPHOCYTES NFR BLD AUTO: 23.9 % (ref 21–51)
MCH RBC QN AUTO: 32.1 PG (ref 27–33)
MCHC RBC AUTO-ENTMCNC: 34 G/DL (ref 33–37)
MCV RBC AUTO: 94.4 FL (ref 80–94)
MONOCYTES # BLD AUTO: 0.59 10*3/MM3 (ref 0.1–0.9)
MONOCYTES NFR BLD AUTO: 9.4 % (ref 0–10)
NEUTROPHILS # BLD AUTO: 3.98 10*3/MM3 (ref 1.4–6.5)
NEUTROPHILS NFR BLD AUTO: 63 % (ref 30–70)
OSMOLALITY SERPL CALC.SUM OF ELEC: 272.7 MOSM/KG (ref 273–305)
PLATELET # BLD AUTO: 244 10*3/MM3 (ref 130–400)
PMV BLD AUTO: 9.7 FL (ref 6–10)
POTASSIUM BLD-SCNC: 3.5 MMOL/L (ref 3.5–5.3)
PROT SERPL-MCNC: 7.2 G/DL (ref 6–8)
PSA SERPL-MCNC: 0.43 NG/ML (ref 0–4)
RBC # BLD AUTO: 4.08 10*6/MM3 (ref 4.7–6.1)
SODIUM BLD-SCNC: 137 MMOL/L (ref 135–153)
TRIGL SERPL-MCNC: 180 MG/DL (ref 0–150)
TSH SERPL DL<=0.05 MIU/L-ACNC: 2.91 MIU/ML (ref 0.55–4.78)
VLDLC SERPL-MCNC: 36 MG/DL
WBC NRBC COR # BLD: 6.31 10*3/MM3 (ref 4.5–12.5)

## 2018-08-03 PROCEDURE — 80061 LIPID PANEL: CPT

## 2018-08-03 PROCEDURE — 80050 GENERAL HEALTH PANEL: CPT

## 2018-08-03 PROCEDURE — 82043 UR ALBUMIN QUANTITATIVE: CPT

## 2018-08-03 PROCEDURE — G0103 PSA SCREENING: HCPCS

## 2018-08-03 PROCEDURE — 36415 COLL VENOUS BLD VENIPUNCTURE: CPT

## 2018-10-31 ENCOUNTER — TRANSCRIBE ORDERS (OUTPATIENT)
Dept: ADMINISTRATIVE | Facility: HOSPITAL | Age: 53
End: 2018-10-31

## 2018-10-31 DIAGNOSIS — Z72.0 TOBACCO ABUSE: Primary | ICD-10-CM

## 2018-11-02 ENCOUNTER — HOSPITAL ENCOUNTER (OUTPATIENT)
Dept: RESPIRATORY THERAPY | Facility: HOSPITAL | Age: 53
Discharge: HOME | End: 2018-11-02

## 2018-11-08 ENCOUNTER — HOSPITAL ENCOUNTER (OUTPATIENT)
Dept: RESPIRATORY THERAPY | Facility: HOSPITAL | Age: 53
Discharge: HOME | End: 2018-11-08
Admitting: FAMILY MEDICINE

## 2018-11-08 DIAGNOSIS — Z72.0 TOBACCO ABUSE: ICD-10-CM

## 2018-11-08 PROCEDURE — 94060 EVALUATION OF WHEEZING: CPT | Performed by: INTERNAL MEDICINE

## 2018-11-08 PROCEDURE — 94060 EVALUATION OF WHEEZING: CPT

## 2018-12-18 ENCOUNTER — OFFICE VISIT (OUTPATIENT)
Dept: RETAIL CLINIC | Facility: CLINIC | Age: 53
End: 2018-12-18

## 2018-12-18 VITALS
DIASTOLIC BLOOD PRESSURE: 80 MMHG | RESPIRATION RATE: 18 BRPM | TEMPERATURE: 97.9 F | OXYGEN SATURATION: 98 % | SYSTOLIC BLOOD PRESSURE: 121 MMHG | HEART RATE: 78 BPM | BODY MASS INDEX: 29.24 KG/M2 | WEIGHT: 165 LBS

## 2018-12-18 DIAGNOSIS — R11.2 NAUSEA AND VOMITING, INTRACTABILITY OF VOMITING NOT SPECIFIED, UNSPECIFIED VOMITING TYPE: Primary | ICD-10-CM

## 2018-12-18 DIAGNOSIS — R19.7 DIARRHEA, UNSPECIFIED TYPE: ICD-10-CM

## 2018-12-18 PROCEDURE — 99213 OFFICE O/P EST LOW 20 MIN: CPT | Performed by: NURSE PRACTITIONER

## 2018-12-18 RX ORDER — PROMETHAZINE HYDROCHLORIDE 12.5 MG/1
12.5 TABLET ORAL EVERY 6 HOURS PRN
Qty: 20 TABLET | Refills: 0 | Status: SHIPPED | OUTPATIENT
Start: 2018-12-18 | End: 2018-12-22

## 2018-12-18 NOTE — PROGRESS NOTES
Chief Complaint  Chief Complaint   Patient presents with   • Nausea   • Vomiting       Subjective   Asif Morin is a 53 y.o. male.   Pt presents with c/o vomiting and diarrhea which started last night and had 3 episodes of vomiting and 2 episodes of diarrhea.  Has been able to drink sprite with no further n/v/d.    Nausea   This is a new problem. The current episode started yesterday. The problem occurs intermittently. The problem has been gradually improving. Associated symptoms include chills, fatigue, nausea and vomiting. Pertinent negatives include no abdominal pain, congestion, coughing, fever, headaches, myalgias, rash or sore throat. The symptoms are aggravated by eating. He has tried nothing for the symptoms.        Current Outpatient Medications on File Prior to Visit   Medication Sig Dispense Refill   • aspirin 81 MG chewable tablet Chew 81 mg Daily.     • atorvastatin (LIPITOR) 20 MG tablet Take 1 tablet by mouth Daily. 30 tablet 2   • cyclobenzaprine (FLEXERIL) 10 MG tablet Take 1 tablet by mouth 3 (three) times a day as needed for muscle spasms. 20 tablet 0   • ibuprofen (ADVIL,MOTRIN) 800 MG tablet Take 1 tablet by mouth every 6 (six) hours as needed for mild pain (1-3). 30 tablet 0   • lisinopril (PRINIVIL,ZESTRIL) 5 MG tablet Take 2 tablets by mouth Daily. 30 tablet 2   • omeprazole (priLOSEC) 20 MG capsule Take 20 mg by mouth Daily.       No current facility-administered medications on file prior to visit.        Allergies   Allergen Reactions   • Bee Venom        Past Medical History:   Diagnosis Date   • Arthritis    • Cancer (CMS/HCC)     SKIN CANCER   • COPD (chronic obstructive pulmonary disease) (CMS/HCC)    • Heart disease    • Hyperlipidemia    • Hypertension    • Myocardial infarction (CMS/HCC)    • Stroke (CMS/HCC)        Past Surgical History:   Procedure Laterality Date   • SKIN CANCER EXCISION      left elbow       Family History   Problem Relation Age of Onset   • No Known  Problems Mother    • No Known Problems Father        Social History     Socioeconomic History   • Marital status:      Spouse name: Not on file   • Number of children: Not on file   • Years of education: Not on file   • Highest education level: Not on file   Social Needs   • Financial resource strain: Not on file   • Food insecurity - worry: Not on file   • Food insecurity - inability: Not on file   • Transportation needs - medical: Not on file   • Transportation needs - non-medical: Not on file   Occupational History   • Not on file   Tobacco Use   • Smoking status: Former Smoker     Packs/day: 1.00   • Smokeless tobacco: Never Used   Substance and Sexual Activity   • Alcohol use: No   • Drug use: No   • Sexual activity: Defer   Other Topics Concern   • Not on file   Social History Narrative   • Not on file       Review of Systems   Constitutional: Positive for appetite change, chills and fatigue. Negative for fever.   HENT: Negative for congestion and sore throat.    Respiratory: Negative for cough.    Gastrointestinal: Positive for diarrhea, nausea and vomiting. Negative for abdominal pain.   Musculoskeletal: Negative for myalgias.   Skin: Negative for rash.   Neurological: Negative for dizziness and headaches.   Psychiatric/Behavioral: Positive for sleep disturbance (due to vomiting and diarrhea).       /80 (BP Location: Left arm, Patient Position: Sitting)   Pulse 78   Temp 97.9 °F (36.6 °C)   Resp 18   Wt 74.8 kg (165 lb)   SpO2 98%   BMI 29.24 kg/m²     Objective   Physical Exam   Constitutional: He is oriented to person, place, and time. Vital signs are normal. He appears well-developed and well-nourished. He is cooperative. No distress.   HENT:   Head: Normocephalic and atraumatic.   Nose: Nose normal.   Mouth/Throat: Oropharynx is clear and moist and mucous membranes are normal.   Neck: Normal range of motion and full passive range of motion without pain. Neck supple.   Cardiovascular:  Normal rate and regular rhythm.   Pulmonary/Chest: Effort normal and breath sounds normal. No respiratory distress.   Abdominal: Soft. Normal appearance and bowel sounds are normal. There is no tenderness.   Neurological: He is alert and oriented to person, place, and time.   Skin: Skin is warm, dry and intact.           Assessment/Plan   Asif was seen today for nausea and vomiting.    Diagnoses and all orders for this visit:    Nausea and vomiting, intractability of vomiting not specified, unspecified vomiting type    Diarrhea, unspecified type    Other orders  -     promethazine (PHENERGAN) 12.5 MG tablet; Take 1 tablet by mouth Every 6 (Six) Hours As Needed for Nausea or Vomiting for up to 4 days.      Instructed to take medication as directed and do not drive while taking due to drowsiness.  F/u with pcp if no improvement or any worsening sx.     RAOUL Bernal

## 2018-12-20 ENCOUNTER — OFFICE VISIT (OUTPATIENT)
Dept: RETAIL CLINIC | Facility: CLINIC | Age: 53
End: 2018-12-20

## 2018-12-20 VITALS
TEMPERATURE: 98 F | BODY MASS INDEX: 31.33 KG/M2 | OXYGEN SATURATION: 98 % | RESPIRATION RATE: 20 BRPM | DIASTOLIC BLOOD PRESSURE: 70 MMHG | SYSTOLIC BLOOD PRESSURE: 100 MMHG | HEART RATE: 92 BPM | WEIGHT: 176.8 LBS

## 2018-12-20 DIAGNOSIS — J30.89 NON-SEASONAL ALLERGIC RHINITIS, UNSPECIFIED TRIGGER: Primary | ICD-10-CM

## 2018-12-20 PROCEDURE — 99213 OFFICE O/P EST LOW 20 MIN: CPT | Performed by: NURSE PRACTITIONER

## 2018-12-20 RX ORDER — FLUTICASONE PROPIONATE 50 MCG
2 SPRAY, SUSPENSION (ML) NASAL DAILY
Qty: 1 BOTTLE | Refills: 0 | Status: SHIPPED | OUTPATIENT
Start: 2018-12-20 | End: 2019-01-19

## 2018-12-20 NOTE — PROGRESS NOTES
Subjective   Asif Morin is a 53 y.o. male.   Chief Complaint   Patient presents with   • URI      URI    This is a new problem. The current episode started today. The problem has been unchanged. There has been no fever. Associated symptoms include congestion (nasal), coughing (non-productive) and sneezing. Pertinent negatives include no chest pain, headaches, nausea, rash, rhinorrhea or sore throat. He has tried nothing for the symptoms.        Asif Morin  presents to [facility] with cc of PND and intermittent cough this morning, denies fever. Reviewed the PMFSH.  See ROS.  The following portions of the patient's history were reviewed and updated as appropriate: allergies, current medications, past family history, past medical history, past social history, past surgical history and problem list.    Review of Systems   Constitutional: Negative for fever.   HENT: Positive for congestion (nasal), postnasal drip and sneezing. Negative for rhinorrhea and sore throat.    Respiratory: Positive for cough (non-productive). Negative for chest tightness.    Cardiovascular: Negative for chest pain.   Gastrointestinal: Negative for nausea.   Musculoskeletal: Negative for arthralgias.   Skin: Negative for rash.   Neurological: Negative for headaches.   Hematological: Negative for adenopathy.       Visit Vitals  /70   Pulse 92   Temp 98 °F (36.7 °C) (Temporal)   Resp 20   Wt 80.2 kg (176 lb 12.8 oz)   SpO2 98%   BMI 31.33 kg/m²       Objective     Current Outpatient Medications:   •  aspirin 81 MG chewable tablet, Chew 81 mg Daily., Disp: , Rfl:   •  atorvastatin (LIPITOR) 20 MG tablet, Take 1 tablet by mouth Daily., Disp: 30 tablet, Rfl: 2  •  cyclobenzaprine (FLEXERIL) 10 MG tablet, Take 1 tablet by mouth 3 (three) times a day as needed for muscle spasms., Disp: 20 tablet, Rfl: 0  •  fluticasone (FLONASE) 50 MCG/ACT nasal spray, 2 sprays into the nostril(s) as directed by provider Daily for 30 days., Disp: 1  bottle, Rfl: 0  •  ibuprofen (ADVIL,MOTRIN) 800 MG tablet, Take 1 tablet by mouth every 6 (six) hours as needed for mild pain (1-3)., Disp: 30 tablet, Rfl: 0  •  lisinopril (PRINIVIL,ZESTRIL) 5 MG tablet, Take 2 tablets by mouth Daily., Disp: 30 tablet, Rfl: 2  •  omeprazole (priLOSEC) 20 MG capsule, Take 20 mg by mouth Daily., Disp: , Rfl:   •  promethazine (PHENERGAN) 12.5 MG tablet, Take 1 tablet by mouth Every 6 (Six) Hours As Needed for Nausea or Vomiting for up to 4 days., Disp: 20 tablet, Rfl: 0  Allergies   Allergen Reactions   • Bee Venom Swelling and Rash       Physical Exam   Constitutional: He is oriented to person, place, and time. He appears well-developed and well-nourished. No distress.   HENT:   Head: Normocephalic and atraumatic.   Right Ear: Tympanic membrane and external ear normal.   Left Ear: Tympanic membrane and external ear normal.   Nose: Mucosal edema present. Right sinus exhibits no maxillary sinus tenderness and no frontal sinus tenderness. Left sinus exhibits no maxillary sinus tenderness and no frontal sinus tenderness.   Mouth/Throat: Uvula is midline, oropharynx is clear and moist and mucous membranes are normal. No oropharyngeal exudate or tonsillar abscesses. No tonsillar exudate.   Eyes: Conjunctivae and EOM are normal. Pupils are equal, round, and reactive to light.   Neck: Normal range of motion. Neck supple.   Cardiovascular: Normal rate, regular rhythm and normal heart sounds.   Pulmonary/Chest: Effort normal and breath sounds normal. No respiratory distress. He has no wheezes.   Abdominal: Soft. Bowel sounds are normal.   Musculoskeletal: Normal range of motion.   Lymphadenopathy:     He has no cervical adenopathy.   Neurological: He is alert and oriented to person, place, and time.   Skin: Skin is warm and dry. No rash noted.   Psychiatric: He has a normal mood and affect. His behavior is normal. Judgment and thought content normal.   Nursing note and vitals  reviewed.      Lab Results (last 24 hours)     ** No results found for the last 24 hours. **          Assessment/Plan   Asif was seen today for uri.    Diagnoses and all orders for this visit:    Non-seasonal allergic rhinitis, unspecified trigger  -     fluticasone (FLONASE) 50 MCG/ACT nasal spray; 2 sprays into the nostril(s) as directed by provider Daily for 30 days.

## 2018-12-20 NOTE — PATIENT INSTRUCTIONS
Allergic Rhinitis, Adult  Allergic rhinitis is an allergic reaction that affects the mucous membrane inside the nose. It causes sneezing, a runny or stuffy nose, and the feeling of mucus going down the back of the throat (postnasal drip). Allergic rhinitis can be mild to severe.  There are two types of allergic rhinitis:  · Seasonal. This type is also called hay fever. It happens only during certain seasons.  · Perennial. This type can happen at any time of the year.    What are the causes?  This condition happens when the body's defense system (immune system) responds to certain harmless substances called allergens as though they were germs.   Seasonal allergic rhinitis is triggered by pollen, which can come from grasses, trees, and weeds. Perennial allergic rhinitis may be caused by:  · House dust mites.  · Pet dander.  · Mold spores.    What are the signs or symptoms?  Symptoms of this condition include:  · Sneezing.  · Runny or stuffy nose (nasal congestion).  · Postnasal drip.  · Itchy nose.  · Tearing of the eyes.  · Trouble sleeping.  · Daytime sleepiness.    How is this diagnosed?  This condition may be diagnosed based on:  · Your medical history.  · A physical exam.  · Tests to check for related conditions, such as:  ? Asthma.  ? Pink eye.  ? Ear infection.  ? Upper respiratory infection.  · Tests to find out which allergens trigger your symptoms. These may include skin or blood tests.    How is this treated?  There is no cure for this condition, but treatment can help control symptoms. Treatment may include:  · Taking medicines that block allergy symptoms, such as antihistamines. Medicine may be given as a shot, nasal spray, or pill.  · Avoiding the allergen.  · Desensitization. This treatment involves getting ongoing shots until your body becomes less sensitive to the allergen. This treatment may be done if other treatments do not help.  · If taking medicine and avoiding the allergen does not work, new,  stronger medicines may be prescribed.    Follow these instructions at home:  · Find out what you are allergic to. Common allergens include smoke, dust, and pollen.  · Avoid the things you are allergic to. These are some things you can do to help avoid allergens:  ? Replace carpet with wood, tile, or vinyl jordi. Carpet can trap dander and dust.  ? Do not smoke. Do not allow smoking in your home.  ? Change your heating and air conditioning filter at least once a month.  ? During allergy season:  § Keep windows closed as much as possible.  § Plan outdoor activities when pollen counts are lowest. This is usually during the evening hours.  § When coming indoors, change clothing and shower before sitting on furniture or bedding.  · Take over-the-counter and prescription medicines only as told by your health care provider.  · Keep all follow-up visits as told by your health care provider. This is important.  Contact a health care provider if:  · You have a fever.  · You develop a persistent cough.  · You make whistling sounds when you breathe (you wheeze).  · Your symptoms interfere with your normal daily activities.  Get help right away if:  · You have shortness of breath.  Summary  · This condition can be managed by taking medicines as directed and avoiding allergens.  · Contact your health care provider if you develop a persistent cough or fever.  · During allergy season, keep windows closed as much as possible.  This information is not intended to replace advice given to you by your health care provider. Make sure you discuss any questions you have with your health care provider.  Document Released: 09/12/2002 Document Revised: 01/25/2018 Document Reviewed: 01/25/2018  Elsevier Interactive Patient Education © 2018 [employer]

## 2019-12-05 ENCOUNTER — OFFICE VISIT (OUTPATIENT)
Dept: CARDIOLOGY | Facility: CLINIC | Age: 54
End: 2019-12-05

## 2019-12-05 VITALS
OXYGEN SATURATION: 95 % | HEIGHT: 63 IN | WEIGHT: 168.4 LBS | BODY MASS INDEX: 29.84 KG/M2 | DIASTOLIC BLOOD PRESSURE: 75 MMHG | SYSTOLIC BLOOD PRESSURE: 114 MMHG | HEART RATE: 75 BPM

## 2019-12-05 DIAGNOSIS — R06.02 SHORTNESS OF BREATH: ICD-10-CM

## 2019-12-05 DIAGNOSIS — I10 ESSENTIAL HYPERTENSION: Primary | ICD-10-CM

## 2019-12-05 DIAGNOSIS — K21.9 GASTROESOPHAGEAL REFLUX DISEASE, ESOPHAGITIS PRESENCE NOT SPECIFIED: ICD-10-CM

## 2019-12-05 DIAGNOSIS — R07.2 PRECORDIAL PAIN: ICD-10-CM

## 2019-12-05 DIAGNOSIS — E78.5 DYSLIPIDEMIA: ICD-10-CM

## 2019-12-05 PROCEDURE — 93000 ELECTROCARDIOGRAM COMPLETE: CPT | Performed by: NURSE PRACTITIONER

## 2019-12-05 PROCEDURE — 99214 OFFICE O/P EST MOD 30 MIN: CPT | Performed by: NURSE PRACTITIONER

## 2019-12-05 RX ORDER — ATORVASTATIN CALCIUM 80 MG/1
80 TABLET, FILM COATED ORAL DAILY
COMMUNITY
End: 2022-04-20

## 2019-12-05 RX ORDER — LISINOPRIL 30 MG/1
30 TABLET ORAL DAILY
COMMUNITY

## 2019-12-05 NOTE — PROGRESS NOTES
Provider, No Known  Asif Morin  1965  12/05/2019    Patient Active Problem List   Diagnosis   • Essential hypertension   • Dyslipidemia       Dear Provider, No Known:    Subjective     Chief Complaint   Patient presents with   • Chest Pain   • Med Management           History of Present Illness:    Asif Morin is a 54 y.o. male with a past medical history significant for no known coronary artery disease, GERD, hypertension, dyslipidemia.  He presents today with complaints of chest pain.  He has not been evaluated in this office since 2017.  His chest pains have been occurring for several months.  However, his last chest pain was about 1 month ago.  He describes this pain as a sharp pain in the epigastric region which radiates to the left chest. No aggravating or alleviating factors.  This pain lasts a few seconds, he has associated dyspnea.  He has a former smoker, but quit 20 years ago.  He has no family history of premature CAD.          Allergies   Allergen Reactions   • Bee Venom Swelling and Rash   :      Current Outpatient Medications:   •  ALBUTEROL IN, Inhale As Needed., Disp: , Rfl:   •  aspirin 81 MG chewable tablet, Chew 81 mg Daily., Disp: , Rfl:   •  atorvastatin (LIPITOR) 40 MG tablet, Take 40 mg by mouth Daily., Disp: , Rfl:   •  lisinopril (PRINIVIL,ZESTRIL) 30 MG tablet, Take 30 mg by mouth Daily., Disp: , Rfl:   •  metoprolol tartrate (LOPRESSOR) 25 MG tablet, Take 25 mg by mouth 2 (Two) Times a Day., Disp: , Rfl:   •  omeprazole (priLOSEC) 40 MG capsule, Take 40 mg by mouth Daily., Disp: , Rfl:   •  cyclobenzaprine (FLEXERIL) 10 MG tablet, Take 1 tablet by mouth 3 (three) times a day as needed for muscle spasms., Disp: 20 tablet, Rfl: 0  •  ibuprofen (ADVIL,MOTRIN) 800 MG tablet, Take 1 tablet by mouth every 6 (six) hours as needed for mild pain (1-3)., Disp: 30 tablet, Rfl: 0      The following portions of the patient's history were reviewed and updated as appropriate:  "allergies, current medications, past family history, past medical history, past social history, past surgical history and problem list.    Social History     Tobacco Use   • Smoking status: Former Smoker     Packs/day: 1.00   • Smokeless tobacco: Never Used   Substance Use Topics   • Alcohol use: No   • Drug use: No       Review of Systems   Constitution: Negative for weakness and malaise/fatigue.   Cardiovascular: Positive for chest pain. Negative for dyspnea on exertion, irregular heartbeat, leg swelling, near-syncope, orthopnea, palpitations, paroxysmal nocturnal dyspnea and syncope.   Respiratory: Positive for shortness of breath. Negative for cough and wheezing.    Neurological: Negative for dizziness and light-headedness.       Objective   Vitals:    12/05/19 1154   BP: 114/75   BP Location: Right arm   Patient Position: Sitting   Cuff Size: Adult   Pulse: 75   SpO2: 95%   Weight: 76.4 kg (168 lb 6.4 oz)   Height: 158.8 cm (62.5\")     Body mass index is 30.31 kg/m².        Physical Exam   Constitutional: He is oriented to person, place, and time. He appears well-developed and well-nourished.   HENT:   Head: Normocephalic and atraumatic.   Cardiovascular: Normal rate, regular rhythm and normal heart sounds. Exam reveals no S3 and no S4.   No murmur heard.  Pulmonary/Chest: Effort normal and breath sounds normal. He has no wheezes. He has no rales.   Abdominal: Soft. Bowel sounds are normal.   Musculoskeletal: He exhibits no edema.   Neurological: He is alert and oriented to person, place, and time.   Skin: Skin is warm and dry.   Psychiatric: He has a normal mood and affect. His behavior is normal.       Lab Results   Component Value Date     08/03/2018    K 3.5 08/03/2018     08/03/2018    CO2 26.4 08/03/2018    BUN 9 08/03/2018    CREATININE 1.48 (H) 08/03/2018    GLUCOSE 102 08/03/2018    CALCIUM 8.8 08/03/2018    AST 24 08/03/2018    ALT 21 08/03/2018    ALKPHOS 95 08/03/2018    LABIL2 1.4 (L) " 05/16/2015        Lab Results   Component Value Date    WBC 6.31 08/03/2018    HGB 13.1 (L) 08/03/2018    HCT 38.5 (L) 08/03/2018     08/03/2018     Lab Results   Component Value Date    INR 0.92 01/26/2017        Lab Results   Component Value Date    TSH 2.913 08/03/2018    PSA 0.430 08/03/2018    CHLPL 207 (H) 05/16/2015    TRIG 180 (H) 08/03/2018    HDL 36 (L) 08/03/2018    LDL 65 08/03/2018               ECG 12 Lead  Date/Time: 12/5/2019 11:52 AM  Performed by: Meghan Peoples CMA  Authorized by: Marcela Colon APRN   Comparison: compared with previous ECG   Similar to previous ECG  Rhythm: sinus rhythm  BPM: 71                Assessment/Plan    Diagnosis Plan   1. Essential hypertension     2. Dyslipidemia     3. Gastroesophageal reflux disease, esophagitis presence not specified     4. Precordial pain  Stress Test With Myocardial Perfusion One Day   5. Shortness of breath  Adult Transthoracic Echo Complete W/ Cont if Necessary Per Protocol    Stress Test With Myocardial Perfusion One Day                Recommendations:    1. Will evaluate chest pains further with a lexiscan sestamibi study (cannot walk on treadmill due to leg and back pain).    2. Will evaluate dyspnea further with an echocardiogram.    3. Continue low dose aspirin, atorvastatin, and metoprolol.    4. Follow up in 4 weeks and PRN.        Return in about 4 weeks (around 1/2/2020) for Recheck.    As always, I appreciate very much the opportunity to participate in the cardiovascular care of your patients.      With Best Regards,    RAOUL Flores

## 2019-12-13 ENCOUNTER — HOSPITAL ENCOUNTER (OUTPATIENT)
Dept: NUCLEAR MEDICINE | Facility: HOSPITAL | Age: 54
Discharge: HOME | End: 2019-12-13

## 2019-12-13 ENCOUNTER — HOSPITAL ENCOUNTER (OUTPATIENT)
Dept: CARDIOLOGY | Facility: HOSPITAL | Age: 54
Discharge: HOME | End: 2019-12-13

## 2019-12-13 DIAGNOSIS — R06.02 SHORTNESS OF BREATH: ICD-10-CM

## 2019-12-13 DIAGNOSIS — R07.2 PRECORDIAL PAIN: ICD-10-CM

## 2019-12-13 LAB
BH CV NUCLEAR PRIOR STUDY: 3
BH CV STRESS BP STAGE 1: NORMAL
BH CV STRESS BP STAGE 2: NORMAL
BH CV STRESS COMMENTS STAGE 1: NORMAL
BH CV STRESS COMMENTS STAGE 2: NORMAL
BH CV STRESS DOSE REGADENOSON STAGE 1: 0.4
BH CV STRESS DURATION MIN STAGE 1: 0
BH CV STRESS DURATION MIN STAGE 2: 4
BH CV STRESS DURATION SEC STAGE 1: 10
BH CV STRESS DURATION SEC STAGE 2: 0
BH CV STRESS HR STAGE 1: 104
BH CV STRESS HR STAGE 2: 57
BH CV STRESS PROTOCOL 1: NORMAL
BH CV STRESS RECOVERY BP: NORMAL MMHG
BH CV STRESS RECOVERY HR: 57 BPM
BH CV STRESS STAGE 1: 1
BH CV STRESS STAGE 2: 2
MAXIMAL PREDICTED HEART RATE: 167 BPM
PERCENT MAX PREDICTED HR: 62.28 %
STRESS BASELINE BP: NORMAL MMHG
STRESS BASELINE HR: 58 BPM
STRESS PERCENT HR: 73 %
STRESS POST PEAK BP: NORMAL MMHG
STRESS POST PEAK HR: 104 BPM
STRESS TARGET HR: 142 BPM

## 2019-12-13 PROCEDURE — 78452 HT MUSCLE IMAGE SPECT MULT: CPT

## 2019-12-13 PROCEDURE — 25010000002 REGADENOSON 0.4 MG/5ML SOLUTION: Performed by: NURSE PRACTITIONER

## 2019-12-13 PROCEDURE — 93306 TTE W/DOPPLER COMPLETE: CPT

## 2019-12-13 PROCEDURE — A9500 TC99M SESTAMIBI: HCPCS | Performed by: NURSE PRACTITIONER

## 2019-12-13 PROCEDURE — 93306 TTE W/DOPPLER COMPLETE: CPT | Performed by: INTERNAL MEDICINE

## 2019-12-13 PROCEDURE — 25010000002 AMINOPHYLLINE PER 250 MG: Performed by: NURSE PRACTITIONER

## 2019-12-13 PROCEDURE — 78452 HT MUSCLE IMAGE SPECT MULT: CPT | Performed by: INTERNAL MEDICINE

## 2019-12-13 PROCEDURE — 93018 CV STRESS TEST I&R ONLY: CPT | Performed by: INTERNAL MEDICINE

## 2019-12-13 PROCEDURE — 0 TECHNETIUM SESTAMIBI: Performed by: NURSE PRACTITIONER

## 2019-12-13 PROCEDURE — 93017 CV STRESS TEST TRACING ONLY: CPT

## 2019-12-13 RX ORDER — AMINOPHYLLINE DIHYDRATE 25 MG/ML
125 INJECTION, SOLUTION INTRAVENOUS
Status: COMPLETED | OUTPATIENT
Start: 2019-12-13 | End: 2019-12-13

## 2019-12-13 RX ADMIN — AMINOPHYLLINE 125 MG: 25 INJECTION, SOLUTION INTRAVENOUS at 11:25

## 2019-12-13 RX ADMIN — TECHNETIUM TC 99M SESTAMIBI 1 DOSE: 1 INJECTION INTRAVENOUS at 10:00

## 2019-12-13 RX ADMIN — REGADENOSON 0.4 MG: 0.08 INJECTION, SOLUTION INTRAVENOUS at 11:16

## 2019-12-13 RX ADMIN — TECHNETIUM TC 99M SESTAMIBI 1 DOSE: 1 INJECTION INTRAVENOUS at 11:16

## 2019-12-15 LAB
BH CV ECHO MEAS - ACS: 1.4 CM
BH CV ECHO MEAS - AO MAX PG: 5.9 MMHG
BH CV ECHO MEAS - AO MEAN PG: 3 MMHG
BH CV ECHO MEAS - AO ROOT AREA (BSA CORRECTED): 1.5
BH CV ECHO MEAS - AO ROOT AREA: 5.9 CM^2
BH CV ECHO MEAS - AO ROOT DIAM: 2.8 CM
BH CV ECHO MEAS - AO V2 MAX: 121 CM/SEC
BH CV ECHO MEAS - AO V2 MEAN: 86.7 CM/SEC
BH CV ECHO MEAS - AO V2 VTI: 25 CM
BH CV ECHO MEAS - BSA(HAYCOCK): 1.9 M^2
BH CV ECHO MEAS - BSA: 1.8 M^2
BH CV ECHO MEAS - BZI_BMI: 30.7 KILOGRAMS/M^2
BH CV ECHO MEAS - BZI_METRIC_HEIGHT: 157.5 CM
BH CV ECHO MEAS - BZI_METRIC_WEIGHT: 76.2 KG
BH CV ECHO MEAS - EDV(CUBED): 37.6 ML
BH CV ECHO MEAS - EDV(MOD-SP4): 33 ML
BH CV ECHO MEAS - EDV(TEICH): 45.8 ML
BH CV ECHO MEAS - EF(CUBED): 87.6 %
BH CV ECHO MEAS - EF(MOD-SP4): 60.6 %
BH CV ECHO MEAS - EF(TEICH): 82.5 %
BH CV ECHO MEAS - ESV(CUBED): 4.7 ML
BH CV ECHO MEAS - ESV(MOD-SP4): 13 ML
BH CV ECHO MEAS - ESV(TEICH): 8 ML
BH CV ECHO MEAS - FS: 50.1 %
BH CV ECHO MEAS - IVS/LVPW: 0.9
BH CV ECHO MEAS - IVSD: 1.3 CM
BH CV ECHO MEAS - LA DIMENSION: 2.6 CM
BH CV ECHO MEAS - LA/AO: 0.95
BH CV ECHO MEAS - LV DIASTOLIC VOL/BSA (35-75): 18.6 ML/M^2
BH CV ECHO MEAS - LV MASS(C)D: 151.7 GRAMS
BH CV ECHO MEAS - LV MASS(C)DI: 85.5 GRAMS/M^2
BH CV ECHO MEAS - LV SYSTOLIC VOL/BSA (12-30): 7.3 ML/M^2
BH CV ECHO MEAS - LVIDD: 3.4 CM
BH CV ECHO MEAS - LVIDS: 1.7 CM
BH CV ECHO MEAS - LVLD AP4: 6.4 CM
BH CV ECHO MEAS - LVLS AP4: 5.5 CM
BH CV ECHO MEAS - LVOT AREA (M): 2.3 CM^2
BH CV ECHO MEAS - LVOT AREA: 2.3 CM^2
BH CV ECHO MEAS - LVOT DIAM: 1.7 CM
BH CV ECHO MEAS - LVPWD: 1.4 CM
BH CV ECHO MEAS - MV A MAX VEL: 56.8 CM/SEC
BH CV ECHO MEAS - MV E MAX VEL: 63.7 CM/SEC
BH CV ECHO MEAS - MV E/A: 1.1
BH CV ECHO MEAS - PA ACC SLOPE: 1328 CM/SEC^2
BH CV ECHO MEAS - PA ACC TIME: 0.06 SEC
BH CV ECHO MEAS - PA PR(ACCEL): 50.7 MMHG
BH CV ECHO MEAS - RAP SYSTOLE: 10 MMHG
BH CV ECHO MEAS - RVSP: 38.5 MMHG
BH CV ECHO MEAS - SI(AO): 83.7 ML/M^2
BH CV ECHO MEAS - SI(CUBED): 18.6 ML/M^2
BH CV ECHO MEAS - SI(MOD-SP4): 11.3 ML/M^2
BH CV ECHO MEAS - SI(TEICH): 21.3 ML/M^2
BH CV ECHO MEAS - SV(AO): 148.5 ML
BH CV ECHO MEAS - SV(CUBED): 32.9 ML
BH CV ECHO MEAS - SV(MOD-SP4): 20 ML
BH CV ECHO MEAS - SV(TEICH): 37.8 ML
BH CV ECHO MEAS - TR MAX VEL: 267 CM/SEC
MAXIMAL PREDICTED HEART RATE: 167 BPM
STRESS TARGET HR: 142 BPM

## 2019-12-17 ENCOUNTER — TELEPHONE (OUTPATIENT)
Dept: CARDIOLOGY | Facility: CLINIC | Age: 54
End: 2019-12-17

## 2019-12-17 NOTE — TELEPHONE ENCOUNTER
----- Message from RAOUL Flores sent at 12/16/2019  4:17 PM EST -----  Stress abnormal. Needs follow within the next 1 to 2 weeks for evaluation        Appt made for Thurs 12/19/19 @ 8:45 AM w/Marcela.    Tried to reach pt; pt not home and person answering phone says they will give him message to return our call.

## 2019-12-19 ENCOUNTER — OFFICE VISIT (OUTPATIENT)
Dept: CARDIOLOGY | Facility: CLINIC | Age: 54
End: 2019-12-19

## 2019-12-19 VITALS
DIASTOLIC BLOOD PRESSURE: 80 MMHG | OXYGEN SATURATION: 99 % | HEART RATE: 67 BPM | SYSTOLIC BLOOD PRESSURE: 119 MMHG | HEIGHT: 63 IN | WEIGHT: 169.6 LBS | BODY MASS INDEX: 30.05 KG/M2

## 2019-12-19 DIAGNOSIS — E78.5 DYSLIPIDEMIA: ICD-10-CM

## 2019-12-19 DIAGNOSIS — R94.39 ABNORMAL NUCLEAR STRESS TEST: ICD-10-CM

## 2019-12-19 DIAGNOSIS — I10 ESSENTIAL HYPERTENSION: Primary | ICD-10-CM

## 2019-12-19 DIAGNOSIS — R07.2 PRECORDIAL PAIN: ICD-10-CM

## 2019-12-19 DIAGNOSIS — Z72.0 TOBACCO USE: ICD-10-CM

## 2019-12-19 PROCEDURE — 99214 OFFICE O/P EST MOD 30 MIN: CPT | Performed by: NURSE PRACTITIONER

## 2019-12-19 RX ORDER — ISOSORBIDE MONONITRATE 30 MG/1
30 TABLET, EXTENDED RELEASE ORAL DAILY
Qty: 30 TABLET | Refills: 11 | Status: ON HOLD | OUTPATIENT
Start: 2019-12-19 | End: 2020-01-13

## 2019-12-19 NOTE — PROGRESS NOTES
Wandy Fragoso DO  Asif Morin  1965  12/19/2019    Patient Active Problem List   Diagnosis   • Essential hypertension   • Dyslipidemia   • Tobacco use   • Precordial pain   • Abnormal nuclear stress test       Dear Wandy Fragoso DO:    Subjective     Chief Complaint   Patient presents with   • Essential hypertension   • Stress test results   • Echo           History of Present Illness:    Asif Morin is a 54 y.o. male with a history of no known coronary artery disease, GERD, hypertension, and dyslipidemia.  Previously, he presented with complaints of chest pain.  He was evaluated further with a Lexiscan sestamibi study which revealed a small to medium size, mild degree of ischemia in the anterior wall.  An echocardiogram revealed normal EF with no significant valvular abnormalities and mild pulmonary hypertension.  He presents today for routine cardiology follow-up.  He reports he continues to have chest pain about once per week.  The pain begins in the epigastric region and radiates into the left chest wall.  He denies any associated dyspnea or diaphoresis.  He denies palpitations, dizziness, or lightheadedness.  He does have a history of hypertension and dyslipidemia.  He is a non-smoker.  He denies history of diabetes.  He denies family history of coronary artery disease.  The patient reports he did undergo cardiac catheterization in [place] about 4 years ago.  At that time, he was told he had normal coronary arteries.        Allergies   Allergen Reactions   • Bee Venom Swelling and Rash   :      Current Outpatient Medications:   •  ALBUTEROL IN, Inhale As Needed., Disp: , Rfl:   •  aspirin 81 MG chewable tablet, Chew 81 mg Daily., Disp: , Rfl:   •  atorvastatin (LIPITOR) 40 MG tablet, Take 40 mg by mouth Daily., Disp: , Rfl:   •  lisinopril (PRINIVIL,ZESTRIL) 30 MG tablet, Take 30 mg by mouth Daily., Disp: , Rfl:   •  metoprolol tartrate (LOPRESSOR) 25 MG tablet, Take 25 mg by  "mouth 2 (Two) Times a Day., Disp: , Rfl:   •  omeprazole (priLOSEC) 40 MG capsule, Take 40 mg by mouth Daily., Disp: , Rfl:   •  cyclobenzaprine (FLEXERIL) 10 MG tablet, Take 1 tablet by mouth 3 (three) times a day as needed for muscle spasms., Disp: 20 tablet, Rfl: 0  •  ibuprofen (ADVIL,MOTRIN) 800 MG tablet, Take 1 tablet by mouth every 6 (six) hours as needed for mild pain (1-3)., Disp: 30 tablet, Rfl: 0  •  isosorbide mononitrate (IMDUR) 30 MG 24 hr tablet, Take 1 tablet by mouth Daily., Disp: 30 tablet, Rfl: 11      The following portions of the patient's history were reviewed and updated as appropriate: allergies, current medications, past family history, past medical history, past social history, past surgical history and problem list.    Social History     Tobacco Use   • Smoking status: Former Smoker     Packs/day: 1.00   • Smokeless tobacco: Never Used   Substance Use Topics   • Alcohol use: No   • Drug use: No       Review of Systems   Constitution: Negative for malaise/fatigue.   Cardiovascular: Positive for chest pain. Negative for dyspnea on exertion, irregular heartbeat, leg swelling, near-syncope, orthopnea, palpitations, paroxysmal nocturnal dyspnea and syncope.   Respiratory: Negative for cough, shortness of breath and wheezing.    Neurological: Negative for dizziness, light-headedness and weakness.       Objective   Vitals:    12/19/19 0830   BP: 119/80   Pulse: 67   SpO2: 99%   Weight: 76.9 kg (169 lb 9.6 oz)   Height: 158.8 cm (62.5\")     Body mass index is 30.53 kg/m².        Physical Exam   Constitutional: He is oriented to person, place, and time. He appears well-developed and well-nourished.   HENT:   Head: Normocephalic and atraumatic.   Cardiovascular: Normal rate, regular rhythm and normal heart sounds. Exam reveals no S3 and no S4.   No murmur heard.  Pulmonary/Chest: Effort normal and breath sounds normal. He has no wheezes. He has no rales.   Abdominal: Soft. Bowel sounds are normal. "   Musculoskeletal: He exhibits no edema.   Neurological: He is alert and oriented to person, place, and time.   Skin: Skin is warm and dry.   Psychiatric: He has a normal mood and affect. His behavior is normal.       Lab Results   Component Value Date     08/03/2018    K 3.5 08/03/2018     08/03/2018    CO2 26.4 08/03/2018    BUN 9 08/03/2018    CREATININE 1.48 (H) 08/03/2018    GLUCOSE 102 08/03/2018    CALCIUM 8.8 08/03/2018    AST 24 08/03/2018    ALT 21 08/03/2018    ALKPHOS 95 08/03/2018    LABIL2 1.4 (L) 05/16/2015      Lab Results   Component Value Date    WBC 6.31 08/03/2018    HGB 13.1 (L) 08/03/2018    HCT 38.5 (L) 08/03/2018     08/03/2018     Lab Results   Component Value Date    INR 0.92 01/26/2017        Lab Results   Component Value Date    TSH 2.913 08/03/2018    PSA 0.430 08/03/2018    CHLPL 207 (H) 05/16/2015    TRIG 180 (H) 08/03/2018    HDL 36 (L) 08/03/2018    LDL 65 08/03/2018             Procedures      Assessment/Plan    Diagnosis Plan   1. Essential hypertension     2. Dyslipidemia     3. Tobacco use     4. Precordial pain  isosorbide mononitrate (IMDUR) 30 MG 24 hr tablet   5. Abnormal nuclear stress test  isosorbide mononitrate (IMDUR) 30 MG 24 hr tablet                Recommendations:    Since the size of ischemia is small and degree is small, will treat medically for now. Start Isosorbide 30 mg daily. Continue low dose aspirin, atorvastatin, lisinopril, and metoprolol. We will see him back in 2 weeks. If his chest pains have not improved, will evaluate further with cardiac catheterization. He voices understanding. I have also advised him to go to the ER if he has any persistent or worsening chest pains.       Return in about 2 weeks (around 1/2/2020) for Recheck.    As always, I appreciate very much the opportunity to participate in the cardiovascular care of your patients.      With Best Regards,    RAOUL Flores

## 2019-12-26 ENCOUNTER — TELEPHONE (OUTPATIENT)
Dept: CARDIOLOGY | Facility: CLINIC | Age: 54
End: 2019-12-26

## 2020-01-06 ENCOUNTER — OFFICE VISIT (OUTPATIENT)
Dept: CARDIOLOGY | Facility: CLINIC | Age: 55
End: 2020-01-06

## 2020-01-06 VITALS
RESPIRATION RATE: 16 BRPM | HEIGHT: 63 IN | DIASTOLIC BLOOD PRESSURE: 79 MMHG | BODY MASS INDEX: 30.19 KG/M2 | WEIGHT: 170.4 LBS | HEART RATE: 53 BPM | SYSTOLIC BLOOD PRESSURE: 121 MMHG

## 2020-01-06 DIAGNOSIS — R07.2 PRECORDIAL PAIN: Primary | ICD-10-CM

## 2020-01-06 DIAGNOSIS — E78.5 DYSLIPIDEMIA: ICD-10-CM

## 2020-01-06 DIAGNOSIS — I10 ESSENTIAL HYPERTENSION: ICD-10-CM

## 2020-01-06 DIAGNOSIS — R94.39 ABNORMAL NUCLEAR STRESS TEST: ICD-10-CM

## 2020-01-06 PROBLEM — Z72.0 TOBACCO USE: Status: RESOLVED | Noted: 2019-12-19 | Resolved: 2020-01-06

## 2020-01-06 PROCEDURE — 99213 OFFICE O/P EST LOW 20 MIN: CPT | Performed by: PHYSICIAN ASSISTANT

## 2020-01-06 RX ORDER — RANOLAZINE 500 MG/1
500 TABLET, EXTENDED RELEASE ORAL 2 TIMES DAILY
Qty: 60 TABLET | Refills: 3 | Status: ON HOLD | OUTPATIENT
Start: 2020-01-06 | End: 2020-01-13

## 2020-01-06 NOTE — H&P (VIEW-ONLY)
"Wandy Fragoso DO  Asif Morin  1965  01/06/2020    Patient Active Problem List   Diagnosis   • Essential hypertension   • Dyslipidemia   • Precordial pain   • Abnormal nuclear stress test       Dear Wandy Fragoso DO:    Subjective     History of Present Illness:    Chief Complaint   Patient presents with   • Chest Pain   • Hypertension       Asif Morin is a pleasant 54 y.o. male with a past medical history significant for  no known coronary artery disease, GERD, hypertension, and dyslipidemia. Comes in for routine cardiology follow up.     Patient reports he was unable to tolerate isosorbide mononitrate due to severe headache.  He does report he is still having chest pains several times weekly that he describes as a pressure in the center of his chest.  He reports he is unable to do his activities of daily living as his pain does come on with exertion.  Reports that it lasts roughly 10 to 15 minutes in duration and does improve when he takes sublingual nitroglycerin.  He does believe he has had an left heart catheterization at some point several years ago he is unsure exactly how long ago at one point he was told me this was in [place] and other points he was told me it was in [place]. His girlfriend believes this was done in [place] 4-5 years ago and was told it was normal. He also states he some sort of \"dye test\" where he had contrast shot into his heart on a chest x-ray however he denies this being a heart cath but is unsure of what it was.  He does deny any diabetes or tobacco abuse currently.    Allergies   Allergen Reactions   • Bee Venom Swelling and Rash   :      Current Outpatient Medications:   •  ALBUTEROL IN, Inhale As Needed., Disp: , Rfl:   •  aspirin 81 MG chewable tablet, Chew 81 mg Daily., Disp: , Rfl:   •  atorvastatin (LIPITOR) 80 MG tablet, Take 80 mg by mouth Daily., Disp: , Rfl:   •  lisinopril (PRINIVIL,ZESTRIL) 30 MG tablet, Take 30 mg by mouth Daily., " "Disp: , Rfl:   •  metoprolol tartrate (LOPRESSOR) 25 MG tablet, Take 25 mg by mouth 2 (Two) Times a Day., Disp: , Rfl:   •  omeprazole (priLOSEC) 40 MG capsule, Take 40 mg by mouth Daily., Disp: , Rfl:   •  cyclobenzaprine (FLEXERIL) 10 MG tablet, Take 1 tablet by mouth 3 (three) times a day as needed for muscle spasms., Disp: 20 tablet, Rfl: 0  •  ibuprofen (ADVIL,MOTRIN) 800 MG tablet, Take 1 tablet by mouth every 6 (six) hours as needed for mild pain (1-3)., Disp: 30 tablet, Rfl: 0  •  isosorbide mononitrate (IMDUR) 30 MG 24 hr tablet, Take 1 tablet by mouth Daily., Disp: 30 tablet, Rfl: 11  •  ranolazine (RANEXA) 500 MG 12 hr tablet, Take 1 tablet by mouth 2 (Two) Times a Day., Disp: 60 tablet, Rfl: 3    The following portions of the patient's history were reviewed and updated as appropriate: allergies, current medications, past family history, past medical history, past social history, past surgical history and problem list.    Social History     Tobacco Use   • Smoking status: Former Smoker     Packs/day: 1.00   • Smokeless tobacco: Never Used   Substance Use Topics   • Alcohol use: No   • Drug use: No       Review of Systems   Constitution: Negative for malaise/fatigue.   Cardiovascular: Positive for chest pain. Negative for dyspnea on exertion and irregular heartbeat.   Respiratory: Negative for cough and shortness of breath.    Hematologic/Lymphatic: Negative for bleeding problem. Does not bruise/bleed easily.   Gastrointestinal: Negative for nausea and vomiting.   Neurological: Negative for weakness.       Objective   Vitals:    01/06/20 1010   BP: 121/79   BP Location: Right arm   Pulse: 53   Resp: 16   Weight: 77.3 kg (170 lb 6.4 oz)   Height: 158.8 cm (62.52\")     Body mass index is 30.65 kg/m².    Physical Exam   Constitutional: He is oriented to person, place, and time. He appears well-developed and well-nourished. No distress.   HENT:   Head: Normocephalic and atraumatic.   Cardiovascular: Normal " rate, regular rhythm and normal heart sounds.   Pulmonary/Chest: Effort normal and breath sounds normal. No respiratory distress.   Musculoskeletal: He exhibits no edema.   Neurological: He is alert and oriented to person, place, and time.   Skin: He is not diaphoretic.       Lab Results   Component Value Date     08/03/2018    K 3.5 08/03/2018     08/03/2018    CO2 26.4 08/03/2018    BUN 9 08/03/2018    CREATININE 1.48 (H) 08/03/2018    GLUCOSE 102 08/03/2018    CALCIUM 8.8 08/03/2018    AST 24 08/03/2018    ALT 21 08/03/2018    ALKPHOS 95 08/03/2018    LABIL2 1.4 (L) 05/16/2015     No results found for: CKTOTAL  Lab Results   Component Value Date    WBC 6.31 08/03/2018    HGB 13.1 (L) 08/03/2018    HCT 38.5 (L) 08/03/2018     08/03/2018     Lab Results   Component Value Date    INR 0.92 01/26/2017     No results found for: MG  Lab Results   Component Value Date    TSH 2.913 08/03/2018    PSA 0.430 08/03/2018    CHLPL 207 (H) 05/16/2015    TRIG 180 (H) 08/03/2018    HDL 36 (L) 08/03/2018    LDL 65 08/03/2018      No results found for: BNP    During this visit the following were done:  Labs Reviewed [x]    Labs Ordered []    Radiology Reports Reviewed [x]    Radiology Ordered []    PCP Records Reviewed []    Referring Provider Records Reviewed []    ER Records Reviewed []    Hospital Records Reviewed []    History Obtained From Family []    Radiology Images Reviewed []    Other Reviewed []    Records Requested []       Procedures    Assessment/Plan    Diagnosis Plan   1. Precordial pain     2. Abnormal nuclear stress test     3. Dyslipidemia     4. Essential hypertension              Recommendations:  1. I will prescribe the patient 500 mg of Ranexa try to help assist in his chest pains.  In the meantime will set him up for [facility] with Dr. Butler, I spoke with Dr. Butler who agreed to perform procedure.   2. I discussed the risk of the procedure such as damage to the arterial system, myocardial  infarction, stroke, and even death.  Patient expressed understanding and wished to proceed.    Return in about 1 month (around 2/6/2020).    As always, I appreciate very much the opportunity to participate in the cardiovascular care of your patients.      With Best Regards,    Milton Ruelas PA-C

## 2020-01-06 NOTE — PROGRESS NOTES
"Wandy Fragoso DO  Asif Morin  1965  01/06/2020    Patient Active Problem List   Diagnosis   • Essential hypertension   • Dyslipidemia   • Precordial pain   • Abnormal nuclear stress test       Dear Wandy Fragoso DO:    Subjective     History of Present Illness:    Chief Complaint   Patient presents with   • Chest Pain   • Hypertension       Asif Morin is a pleasant 54 y.o. male with a past medical history significant for  no known coronary artery disease, GERD, hypertension, and dyslipidemia. Comes in for routine cardiology follow up.     Patient reports he was unable to tolerate isosorbide mononitrate due to severe headache.  He does report he is still having chest pains several times weekly that he describes as a pressure in the center of his chest.  He reports he is unable to do his activities of daily living as his pain does come on with exertion.  Reports that it lasts roughly 10 to 15 minutes in duration and does improve when he takes sublingual nitroglycerin.  He does believe he has had an left heart catheterization at some point several years ago he is unsure exactly how long ago at one point he was told me this was in [place] and other points he was told me it was in [place]. His girlfriend believes this was done in [place] 4-5 years ago and was told it was normal. He also states he some sort of \"dye test\" where he had contrast shot into his heart on a chest x-ray however he denies this being a heart cath but is unsure of what it was.  He does deny any diabetes or tobacco abuse currently.    Allergies   Allergen Reactions   • Bee Venom Swelling and Rash   :      Current Outpatient Medications:   •  ALBUTEROL IN, Inhale As Needed., Disp: , Rfl:   •  aspirin 81 MG chewable tablet, Chew 81 mg Daily., Disp: , Rfl:   •  atorvastatin (LIPITOR) 80 MG tablet, Take 80 mg by mouth Daily., Disp: , Rfl:   •  lisinopril (PRINIVIL,ZESTRIL) 30 MG tablet, Take 30 mg by mouth Daily., " "Disp: , Rfl:   •  metoprolol tartrate (LOPRESSOR) 25 MG tablet, Take 25 mg by mouth 2 (Two) Times a Day., Disp: , Rfl:   •  omeprazole (priLOSEC) 40 MG capsule, Take 40 mg by mouth Daily., Disp: , Rfl:   •  cyclobenzaprine (FLEXERIL) 10 MG tablet, Take 1 tablet by mouth 3 (three) times a day as needed for muscle spasms., Disp: 20 tablet, Rfl: 0  •  ibuprofen (ADVIL,MOTRIN) 800 MG tablet, Take 1 tablet by mouth every 6 (six) hours as needed for mild pain (1-3)., Disp: 30 tablet, Rfl: 0  •  isosorbide mononitrate (IMDUR) 30 MG 24 hr tablet, Take 1 tablet by mouth Daily., Disp: 30 tablet, Rfl: 11  •  ranolazine (RANEXA) 500 MG 12 hr tablet, Take 1 tablet by mouth 2 (Two) Times a Day., Disp: 60 tablet, Rfl: 3    The following portions of the patient's history were reviewed and updated as appropriate: allergies, current medications, past family history, past medical history, past social history, past surgical history and problem list.    Social History     Tobacco Use   • Smoking status: Former Smoker     Packs/day: 1.00   • Smokeless tobacco: Never Used   Substance Use Topics   • Alcohol use: No   • Drug use: No       Review of Systems   Constitution: Negative for malaise/fatigue.   Cardiovascular: Positive for chest pain. Negative for dyspnea on exertion and irregular heartbeat.   Respiratory: Negative for cough and shortness of breath.    Hematologic/Lymphatic: Negative for bleeding problem. Does not bruise/bleed easily.   Gastrointestinal: Negative for nausea and vomiting.   Neurological: Negative for weakness.       Objective   Vitals:    01/06/20 1010   BP: 121/79   BP Location: Right arm   Pulse: 53   Resp: 16   Weight: 77.3 kg (170 lb 6.4 oz)   Height: 158.8 cm (62.52\")     Body mass index is 30.65 kg/m².    Physical Exam   Constitutional: He is oriented to person, place, and time. He appears well-developed and well-nourished. No distress.   HENT:   Head: Normocephalic and atraumatic.   Cardiovascular: Normal " rate, regular rhythm and normal heart sounds.   Pulmonary/Chest: Effort normal and breath sounds normal. No respiratory distress.   Musculoskeletal: He exhibits no edema.   Neurological: He is alert and oriented to person, place, and time.   Skin: He is not diaphoretic.       Lab Results   Component Value Date     08/03/2018    K 3.5 08/03/2018     08/03/2018    CO2 26.4 08/03/2018    BUN 9 08/03/2018    CREATININE 1.48 (H) 08/03/2018    GLUCOSE 102 08/03/2018    CALCIUM 8.8 08/03/2018    AST 24 08/03/2018    ALT 21 08/03/2018    ALKPHOS 95 08/03/2018    LABIL2 1.4 (L) 05/16/2015     No results found for: CKTOTAL  Lab Results   Component Value Date    WBC 6.31 08/03/2018    HGB 13.1 (L) 08/03/2018    HCT 38.5 (L) 08/03/2018     08/03/2018     Lab Results   Component Value Date    INR 0.92 01/26/2017     No results found for: MG  Lab Results   Component Value Date    TSH 2.913 08/03/2018    PSA 0.430 08/03/2018    CHLPL 207 (H) 05/16/2015    TRIG 180 (H) 08/03/2018    HDL 36 (L) 08/03/2018    LDL 65 08/03/2018      No results found for: BNP    During this visit the following were done:  Labs Reviewed [x]    Labs Ordered []    Radiology Reports Reviewed [x]    Radiology Ordered []    PCP Records Reviewed []    Referring Provider Records Reviewed []    ER Records Reviewed []    Hospital Records Reviewed []    History Obtained From Family []    Radiology Images Reviewed []    Other Reviewed []    Records Requested []       Procedures    Assessment/Plan    Diagnosis Plan   1. Precordial pain     2. Abnormal nuclear stress test     3. Dyslipidemia     4. Essential hypertension              Recommendations:  1. I will prescribe the patient 500 mg of Ranexa try to help assist in his chest pains.  In the meantime will set him up for [facility] with Dr. Butler, I spoke with Dr. Butler who agreed to perform procedure.   2. I discussed the risk of the procedure such as damage to the arterial system, myocardial  infarction, stroke, and even death.  Patient expressed understanding and wished to proceed.    Return in about 1 month (around 2/6/2020).    As always, I appreciate very much the opportunity to participate in the cardiovascular care of your patients.      With Best Regards,    Milton Ruelas PA-C

## 2020-01-07 ENCOUNTER — PRIOR AUTHORIZATION (OUTPATIENT)
Dept: CARDIOLOGY | Facility: CLINIC | Age: 55
End: 2020-01-07

## 2020-01-08 ENCOUNTER — PREP FOR SURGERY (OUTPATIENT)
Dept: OTHER | Facility: HOSPITAL | Age: 55
End: 2020-01-08

## 2020-01-08 DIAGNOSIS — I20.9 ANGINA, CLASS III (HCC): Primary | ICD-10-CM

## 2020-01-09 ENCOUNTER — TELEPHONE (OUTPATIENT)
Dept: CARDIOLOGY | Facility: CLINIC | Age: 55
End: 2020-01-09

## 2020-01-09 DIAGNOSIS — R94.39 ABNORMAL NUCLEAR STRESS TEST: Primary | ICD-10-CM

## 2020-01-09 DIAGNOSIS — E78.5 DYSLIPIDEMIA: ICD-10-CM

## 2020-01-09 DIAGNOSIS — I10 ESSENTIAL HYPERTENSION: ICD-10-CM

## 2020-01-09 DIAGNOSIS — R07.2 PRECORDIAL PAIN: ICD-10-CM

## 2020-01-09 NOTE — TELEPHONE ENCOUNTER
Hope from [facility] called to notify our office that Milton had spoke to Dr. Butler about patient's cath. Their office has put the orders in for case request but wanted to let us know so that we could follow up with Dr. Butler about getting it scheduled. I have discussed this with Marlena in our office.

## 2020-01-10 ENCOUNTER — LAB (OUTPATIENT)
Dept: LAB | Facility: HOSPITAL | Age: 55
End: 2020-01-10

## 2020-01-10 DIAGNOSIS — E78.5 DYSLIPIDEMIA: ICD-10-CM

## 2020-01-10 DIAGNOSIS — I20.9 ANGINA, CLASS III (HCC): ICD-10-CM

## 2020-01-10 DIAGNOSIS — R94.39 ABNORMAL NUCLEAR STRESS TEST: ICD-10-CM

## 2020-01-10 DIAGNOSIS — I10 ESSENTIAL HYPERTENSION: ICD-10-CM

## 2020-01-10 DIAGNOSIS — R07.2 PRECORDIAL PAIN: ICD-10-CM

## 2020-01-10 LAB
ANION GAP SERPL CALCULATED.3IONS-SCNC: 16.1 MMOL/L (ref 5–15)
APTT PPP: 29.9 SECONDS (ref 23.8–36.1)
BASOPHILS # BLD AUTO: 0.04 10*3/MM3 (ref 0–0.2)
BASOPHILS NFR BLD AUTO: 0.7 % (ref 0–1.5)
BUN BLD-MCNC: 14 MG/DL (ref 6–20)
BUN/CREAT SERPL: 11.4 (ref 7–25)
CALCIUM SPEC-SCNC: 9.4 MG/DL (ref 8.6–10.5)
CHLORIDE SERPL-SCNC: 101 MMOL/L (ref 98–107)
CO2 SERPL-SCNC: 20.9 MMOL/L (ref 22–29)
CREAT BLD-MCNC: 1.23 MG/DL (ref 0.76–1.27)
DEPRECATED RDW RBC AUTO: 39.7 FL (ref 37–54)
EOSINOPHIL # BLD AUTO: 0.17 10*3/MM3 (ref 0–0.4)
EOSINOPHIL NFR BLD AUTO: 3 % (ref 0.3–6.2)
ERYTHROCYTE [DISTWIDTH] IN BLOOD BY AUTOMATED COUNT: 11.9 % (ref 12.3–15.4)
GFR SERPL CREATININE-BSD FRML MDRD: 62 ML/MIN/1.73
GLUCOSE BLD-MCNC: 145 MG/DL (ref 65–99)
HCT VFR BLD AUTO: 40.6 % (ref 37.5–51)
HGB BLD-MCNC: 14.1 G/DL (ref 13–17.7)
IMM GRANULOCYTES # BLD AUTO: 0.01 10*3/MM3 (ref 0–0.05)
IMM GRANULOCYTES NFR BLD AUTO: 0.2 % (ref 0–0.5)
INR PPP: 0.93 (ref 0.9–1.1)
LYMPHOCYTES # BLD AUTO: 1.72 10*3/MM3 (ref 0.7–3.1)
LYMPHOCYTES NFR BLD AUTO: 30.4 % (ref 19.6–45.3)
MCH RBC QN AUTO: 31.8 PG (ref 26.6–33)
MCHC RBC AUTO-ENTMCNC: 34.7 G/DL (ref 31.5–35.7)
MCV RBC AUTO: 91.6 FL (ref 79–97)
MONOCYTES # BLD AUTO: 0.48 10*3/MM3 (ref 0.1–0.9)
MONOCYTES NFR BLD AUTO: 8.5 % (ref 5–12)
NEUTROPHILS # BLD AUTO: 3.23 10*3/MM3 (ref 1.7–7)
NEUTROPHILS NFR BLD AUTO: 57.2 % (ref 42.7–76)
NRBC BLD AUTO-RTO: 0 /100 WBC (ref 0–0.2)
PLATELET # BLD AUTO: 244 10*3/MM3 (ref 140–450)
PMV BLD AUTO: 10.1 FL (ref 6–12)
POTASSIUM BLD-SCNC: 3.8 MMOL/L (ref 3.5–5.2)
PROTHROMBIN TIME: 12.9 SECONDS (ref 11–15.4)
RBC # BLD AUTO: 4.43 10*6/MM3 (ref 4.14–5.8)
SODIUM BLD-SCNC: 138 MMOL/L (ref 136–145)
WBC NRBC COR # BLD: 5.65 10*3/MM3 (ref 3.4–10.8)

## 2020-01-10 PROCEDURE — 85730 THROMBOPLASTIN TIME PARTIAL: CPT

## 2020-01-10 PROCEDURE — 36415 COLL VENOUS BLD VENIPUNCTURE: CPT

## 2020-01-10 PROCEDURE — 85025 COMPLETE CBC W/AUTO DIFF WBC: CPT

## 2020-01-10 PROCEDURE — 85610 PROTHROMBIN TIME: CPT

## 2020-01-10 PROCEDURE — 80048 BASIC METABOLIC PNL TOTAL CA: CPT

## 2020-01-13 ENCOUNTER — HOSPITAL ENCOUNTER (OUTPATIENT)
Facility: HOSPITAL | Age: 55
Discharge: HOME | End: 2020-01-13
Attending: INTERNAL MEDICINE | Admitting: INTERNAL MEDICINE

## 2020-01-13 VITALS
HEART RATE: 50 BPM | WEIGHT: 164 LBS | SYSTOLIC BLOOD PRESSURE: 105 MMHG | RESPIRATION RATE: 16 BRPM | BODY MASS INDEX: 30.18 KG/M2 | DIASTOLIC BLOOD PRESSURE: 73 MMHG | HEIGHT: 62 IN | OXYGEN SATURATION: 97 % | TEMPERATURE: 98.6 F

## 2020-01-13 DIAGNOSIS — I20.9 ANGINA, CLASS III (HCC): ICD-10-CM

## 2020-01-13 PROCEDURE — 0 IOPAMIDOL PER 1 ML: Performed by: INTERNAL MEDICINE

## 2020-01-13 PROCEDURE — 25010000002 MIDAZOLAM PER 1 MG: Performed by: INTERNAL MEDICINE

## 2020-01-13 PROCEDURE — 25010000002 HEPARIN (PORCINE) PER 1000 UNITS: Performed by: INTERNAL MEDICINE

## 2020-01-13 PROCEDURE — 93454 CORONARY ARTERY ANGIO S&I: CPT | Performed by: INTERNAL MEDICINE

## 2020-01-13 PROCEDURE — 25010000002 FENTANYL CITRATE (PF) 100 MCG/2ML SOLUTION: Performed by: INTERNAL MEDICINE

## 2020-01-13 PROCEDURE — 99152 MOD SED SAME PHYS/QHP 5/>YRS: CPT | Performed by: INTERNAL MEDICINE

## 2020-01-13 PROCEDURE — C1769 GUIDE WIRE: HCPCS | Performed by: INTERNAL MEDICINE

## 2020-01-13 PROCEDURE — C1894 INTRO/SHEATH, NON-LASER: HCPCS | Performed by: INTERNAL MEDICINE

## 2020-01-13 PROCEDURE — 99153 MOD SED SAME PHYS/QHP EA: CPT | Performed by: INTERNAL MEDICINE

## 2020-01-13 RX ORDER — SODIUM CHLORIDE 9 MG/ML
INJECTION, SOLUTION INTRAVENOUS CONTINUOUS PRN
Status: COMPLETED | OUTPATIENT
Start: 2020-01-13 | End: 2020-01-13

## 2020-01-13 RX ORDER — ASPIRIN 81 MG/1
81 TABLET, CHEWABLE ORAL DAILY
Status: DISCONTINUED | OUTPATIENT
Start: 2020-01-13 | End: 2020-01-13 | Stop reason: HOSPADM

## 2020-01-13 RX ORDER — ATORVASTATIN CALCIUM 40 MG/1
80 TABLET, FILM COATED ORAL DAILY
Status: DISCONTINUED | OUTPATIENT
Start: 2020-01-13 | End: 2020-01-13 | Stop reason: HOSPADM

## 2020-01-13 RX ORDER — MIDAZOLAM HYDROCHLORIDE 1 MG/ML
INJECTION INTRAMUSCULAR; INTRAVENOUS AS NEEDED
Status: DISCONTINUED | OUTPATIENT
Start: 2020-01-13 | End: 2020-01-13 | Stop reason: HOSPADM

## 2020-01-13 RX ORDER — ONDANSETRON 4 MG/1
4 TABLET, FILM COATED ORAL EVERY 6 HOURS PRN
Status: CANCELLED | OUTPATIENT
Start: 2020-01-13

## 2020-01-13 RX ORDER — LIDOCAINE HYDROCHLORIDE 20 MG/ML
INJECTION, SOLUTION INFILTRATION; PERINEURAL AS NEEDED
Status: DISCONTINUED | OUTPATIENT
Start: 2020-01-13 | End: 2020-01-13 | Stop reason: HOSPADM

## 2020-01-13 RX ORDER — ONDANSETRON 2 MG/ML
4 INJECTION INTRAMUSCULAR; INTRAVENOUS EVERY 6 HOURS PRN
Status: CANCELLED | OUTPATIENT
Start: 2020-01-13

## 2020-01-13 RX ORDER — SODIUM CHLORIDE 9 MG/ML
100 INJECTION, SOLUTION INTRAVENOUS ONCE
Status: CANCELLED | OUTPATIENT
Start: 2020-01-13 | End: 2020-01-13

## 2020-01-13 RX ORDER — LISINOPRIL 10 MG/1
30 TABLET ORAL DAILY
Status: DISCONTINUED | OUTPATIENT
Start: 2020-01-13 | End: 2020-01-13 | Stop reason: HOSPADM

## 2020-01-13 RX ORDER — FENTANYL CITRATE 50 UG/ML
INJECTION, SOLUTION INTRAMUSCULAR; INTRAVENOUS AS NEEDED
Status: DISCONTINUED | OUTPATIENT
Start: 2020-01-13 | End: 2020-01-13 | Stop reason: HOSPADM

## 2020-01-13 RX ORDER — LORAZEPAM 2 MG/ML
1 INJECTION INTRAMUSCULAR EVERY 6 HOURS PRN
Status: CANCELLED | OUTPATIENT
Start: 2020-01-13 | End: 2020-01-23

## 2020-01-13 RX ORDER — PANTOPRAZOLE SODIUM 40 MG/1
40 TABLET, DELAYED RELEASE ORAL EVERY MORNING
Status: DISCONTINUED | OUTPATIENT
Start: 2020-01-13 | End: 2020-01-13 | Stop reason: HOSPADM

## 2020-01-13 NOTE — DISCHARGE INSTR - ACTIVITY
Do not lift more than 10 pounds for 1 week    Do not submerge right wrist in water for 3 days    Do not drive or sign documents for 24 hours        Do not work for 1 week

## 2020-01-13 NOTE — NURSING NOTE
Discharge instructions discussed with patient and significant other.  Both parties verbalized understanding of post cath care and limitations.  Patient ambulated off unit for transport home per POV.

## 2020-01-14 NOTE — TELEPHONE ENCOUNTER
Called pt and advised him that his insurance denied the Ranexa and Milton stated for him to stop it. I spoke with Kiah (on [employer]) and advised her. She expressed understanding. She was also stating that Asif was having pain from his cath incision and wanted something called in. I advised her to contact  since he was the provider that done the cath. She expressed understanding. She was also wanting to know if he was able to drive. I advised her to ask  office when she calls them about him having pain from cath incision.

## 2020-02-24 ENCOUNTER — OFFICE VISIT (OUTPATIENT)
Dept: CARDIOLOGY | Facility: CLINIC | Age: 55
End: 2020-02-24

## 2020-02-24 VITALS
DIASTOLIC BLOOD PRESSURE: 89 MMHG | HEART RATE: 66 BPM | OXYGEN SATURATION: 97 % | SYSTOLIC BLOOD PRESSURE: 134 MMHG | BODY MASS INDEX: 31.65 KG/M2 | WEIGHT: 172 LBS | HEIGHT: 62 IN

## 2020-02-24 DIAGNOSIS — R07.2 PRECORDIAL PAIN: Primary | ICD-10-CM

## 2020-02-24 DIAGNOSIS — E78.5 DYSLIPIDEMIA: ICD-10-CM

## 2020-02-24 DIAGNOSIS — I10 ESSENTIAL HYPERTENSION: ICD-10-CM

## 2020-02-24 PROCEDURE — 99214 OFFICE O/P EST MOD 30 MIN: CPT | Performed by: PHYSICIAN ASSISTANT

## 2020-02-24 RX ORDER — NITROGLYCERIN 0.4 MG/1
TABLET SUBLINGUAL
COMMUNITY
Start: 2020-02-21 | End: 2022-03-23 | Stop reason: SDUPTHER

## 2020-02-24 NOTE — PROGRESS NOTES
Wandy Fragoso DO  Asif Morin  1965  02/24/2020    Patient Active Problem List   Diagnosis   • Essential hypertension   • Dyslipidemia   • Precordial pain   • Abnormal nuclear stress test   • Angina, class III (CMS/HCC)       Dear Wandy rFagoso DO:    Subjective     History of Present Illness:    Chief Complaint   Patient presents with   • Chest Pain   • Hypertension       Asif Morin is a pleasant 54 y.o. male with a past medical history significant for  no known coronary artery disease, GERD, hypertension, and dyslipidemia. Comes in for routine cardiology follow up.     Mr. Morin recently did have left heart catheterization on 1/13/2020 that did reveal normal coronary arteries.  He does report he is still having chest pains that starts at his lower sternum radiating up to his left chest that he describes as a pressure/tightness he does report that these pains do not come on as frequently as they once did reporting only 2-3 times monthly.  He does report he still has a gallbladder and has not had this evaluated.  He denies shortness of breath or palpitations.      Left heart cath on 1/13/2020  Conclusion   Patient Name: Asif Morin                  MRN: [id number]     Procedure Date: 1/13/2020     HPI: Patient is a pleasant 54 y.o. male with history of chest pains recurrent. Patient presented with abnormal stress test. Patient was evaluated and recommended to proceed with diagnostic cardiac catheterization with possible need for intervention. All risks, benefits and alternatives were discussed with patient in detail. All his questions and his family's questions were answered to their satisfaction. They all understood and wished to proceed, and therefore the procedure was performed.     Pre-operative Diagnosis: Angina not responding to medical therapy        Procedure Performed:  Selective coronary angiography.        Technique: The patient was brought to the cardiac  catheterization laboratory after informed consent was obtained. right radial artery area was prepped, draped, anesthestized in the usual manner. The radial artery was entered jonah a Seldinger technique. A 6-Libyan sheath over the wire was introduced into the radial artery. This was followed by the use of a 6 -Libyan  diagnostic catheters JR 3.5 and JR4 to perform the diagnostic cardiac catheterization. Patient tolerated the procedure well, was hemodynamically stable throughout the procedure. Radial cocktail was administered via the sheath side arm at the time of access.     At the end of the procedure sheath was removed, wrist band was placed. Excellent hemostasis was achieved. Patient transferred to post cath holding area in stable condition.     Findings:     Coronary anatomy:     The left main coronary artery bifurcated into the LAD and left circumflex coronary.  The LAD coursed in the anterior interventricular groove, gave rise to diagonal branches and reached the apex.     Left circumflex coursed in the left atrial ventricular groove and gives rise to several marginal branches.     The right coronary artery course in the right atrial ventricular groove I gave rise to several acute marginal branches.                                 Dominant vessel: Right                                LM: NL                             LAD: NL                               Diagonal Branch: NL                                LCX: NL                               Obtuse marginal branch:   NL                                                                   RCA: NL        No specimens were removed  EBL: 50 ML     Impression:   Normal coronary arteries     Plan:   Risk factor modification  Work up for non-cardiac causes if clinically indicated     Siddhartha Butler MD  01/13/20       Director, Cardiac Cath Lab         Allergies   Allergen Reactions   • Bee Venom Swelling and Rash   :      Current Outpatient Medications:   •  ALBUTEROL  "IN, Inhale As Needed., Disp: , Rfl:   •  atorvastatin (LIPITOR) 80 MG tablet, Take 80 mg by mouth Daily., Disp: , Rfl:   •  lisinopril (PRINIVIL,ZESTRIL) 30 MG tablet, Take 30 mg by mouth Daily., Disp: , Rfl:   •  metoprolol tartrate (LOPRESSOR) 25 MG tablet, Take 25 mg by mouth 2 (Two) Times a Day., Disp: , Rfl:   •  nitroglycerin (NITROSTAT) 0.4 MG SL tablet, , Disp: , Rfl:   •  omeprazole (priLOSEC) 40 MG capsule, Take 40 mg by mouth Daily., Disp: , Rfl:   •  ibuprofen (ADVIL,MOTRIN) 800 MG tablet, Take 1 tablet by mouth every 6 (six) hours as needed for mild pain (1-3)., Disp: 30 tablet, Rfl: 0    The following portions of the patient's history were reviewed and updated as appropriate: allergies, current medications, past family history, past medical history, past social history, past surgical history and problem list.    Social History     Tobacco Use   • Smoking status: Former Smoker     Packs/day: 1.00   • Smokeless tobacco: Never Used   Substance Use Topics   • Alcohol use: No   • Drug use: No       Review of Systems   Constitution: Negative for malaise/fatigue.   Cardiovascular: Positive for chest pain and dyspnea on exertion. Negative for irregular heartbeat.   Respiratory: Negative for cough and shortness of breath.    Hematologic/Lymphatic: Negative for bleeding problem. Does not bruise/bleed easily.   Gastrointestinal: Negative for nausea and vomiting.   Neurological: Negative for weakness.       Objective   Vitals:    02/24/20 0914   BP: 134/89   BP Location: Right arm   Patient Position: Sitting   Cuff Size: Adult   Pulse: 66   SpO2: 97%   Weight: 78 kg (172 lb)   Height: 157.5 cm (62.01\")     Body mass index is 31.45 kg/m².    Physical Exam   Constitutional: He is oriented to person, place, and time. He appears well-developed and well-nourished. No distress.   HENT:   Head: Normocephalic and atraumatic.   Cardiovascular: Normal rate, regular rhythm and normal heart sounds.   Pulmonary/Chest: Effort " normal and breath sounds normal. No respiratory distress.   Musculoskeletal: He exhibits no edema.   Neurological: He is alert and oriented to person, place, and time.   Skin: He is not diaphoretic.       Lab Results   Component Value Date     01/10/2020    K 3.8 01/10/2020     01/10/2020    CO2 20.9 (L) 01/10/2020    BUN 14 01/10/2020    CREATININE 1.23 01/10/2020    GLUCOSE 145 (H) 01/10/2020    CALCIUM 9.4 01/10/2020    AST 24 08/03/2018    ALT 21 08/03/2018    ALKPHOS 95 08/03/2018    LABIL2 1.4 (L) 05/16/2015     No results found for: CKTOTAL  Lab Results   Component Value Date    WBC 5.65 01/10/2020    HGB 14.1 01/10/2020    HCT 40.6 01/10/2020     01/10/2020     Lab Results   Component Value Date    INR 0.93 01/10/2020    INR 0.92 01/26/2017     No results found for: MG  Lab Results   Component Value Date    TSH 2.913 08/03/2018    PSA 0.430 08/03/2018    CHLPL 207 (H) 05/16/2015    TRIG 180 (H) 08/03/2018    HDL 36 (L) 08/03/2018    LDL 65 08/03/2018      No results found for: BNP    During this visit the following were done:  Labs Reviewed [x]    Labs Ordered []    Radiology Reports Reviewed [x]    Radiology Ordered []    PCP Records Reviewed []    Referring Provider Records Reviewed []    ER Records Reviewed []    Hospital Records Reviewed []    History Obtained From Family []    Radiology Images Reviewed []    Other Reviewed []    Records Requested []       Procedures    Assessment/Plan    Diagnosis Plan   1. Precordial pain     2. Dyslipidemia     3. Essential hypertension              Recommendations:  1. I discussed results of left heart catheterization that revealed normal coronary arteries.  2. Recommend noncardiac work-up with a HIDA scan and EGD.  3. Stopped patient's aspirin.      Return in about 6 months (around 8/24/2020).    As always, I appreciate very much the opportunity to participate in the cardiovascular care of your patients.      With Best Regards,    Milton Ruelas,  [facility]

## 2020-08-24 ENCOUNTER — OFFICE VISIT (OUTPATIENT)
Dept: CARDIOLOGY | Facility: CLINIC | Age: 55
End: 2020-08-24

## 2020-08-24 VITALS
DIASTOLIC BLOOD PRESSURE: 65 MMHG | WEIGHT: 168 LBS | HEART RATE: 56 BPM | OXYGEN SATURATION: 97 % | BODY MASS INDEX: 29.77 KG/M2 | HEIGHT: 63 IN | TEMPERATURE: 97.6 F | SYSTOLIC BLOOD PRESSURE: 105 MMHG

## 2020-08-24 DIAGNOSIS — I10 ESSENTIAL HYPERTENSION: Primary | ICD-10-CM

## 2020-08-24 DIAGNOSIS — E78.5 DYSLIPIDEMIA: ICD-10-CM

## 2020-08-24 PROCEDURE — 93000 ELECTROCARDIOGRAM COMPLETE: CPT | Performed by: PHYSICIAN ASSISTANT

## 2020-08-24 PROCEDURE — 99213 OFFICE O/P EST LOW 20 MIN: CPT | Performed by: PHYSICIAN ASSISTANT

## 2020-08-24 NOTE — PROGRESS NOTES
Wandy Fragoso DO  Asif Morin  1965  08/24/2020    Patient Active Problem List   Diagnosis   • Essential hypertension   • Dyslipidemia   • Precordial pain   • Abnormal nuclear stress test   • Angina, class III (CMS/HCC)       Dear Wandy Fragoso DO:    Subjective     History of Present Illness:    Chief Complaint   Patient presents with   • Med Management     Verbal.    • Chest Pain   • Numbness     hands   • Palpitations       Asif Morin is a pleasant 54 y.o. male with a past medical history significant for chronic chest pains with normal LHC on 1/13/2020. He does have history of GERD, essential hypertension, and dyslipidemia. He comes in for routine cardiology follow up.     Patient reports that he is still having chest pains that he reports that has been occurring chronically for the last year. He reports that they come on at random and only occur every other week. His biggest compliant today is symptoms of carpal tunnel syndrome. He denies any shortness of breath, syncope, or near syncope.       Allergies   Allergen Reactions   • Bee Venom Swelling and Rash   :      Current Outpatient Medications:   •  ALBUTEROL IN, Inhale As Needed., Disp: , Rfl:   •  atorvastatin (LIPITOR) 80 MG tablet, Take 80 mg by mouth Daily., Disp: , Rfl:   •  ibuprofen (ADVIL,MOTRIN) 800 MG tablet, Take 1 tablet by mouth every 6 (six) hours as needed for mild pain (1-3)., Disp: 30 tablet, Rfl: 0  •  lisinopril (PRINIVIL,ZESTRIL) 30 MG tablet, Take 30 mg by mouth Daily., Disp: , Rfl:   •  metoprolol tartrate (LOPRESSOR) 25 MG tablet, Take 25 mg by mouth 2 (Two) Times a Day., Disp: , Rfl:   •  nitroglycerin (NITROSTAT) 0.4 MG SL tablet, , Disp: , Rfl:   •  omeprazole (priLOSEC) 40 MG capsule, Take 40 mg by mouth Daily., Disp: , Rfl:     The following portions of the patient's history were reviewed and updated as appropriate: allergies, current medications, past family history, past medical history, past  "social history, past surgical history and problem list.    Social History     Tobacco Use   • Smoking status: Former Smoker     Packs/day: 1.00   • Smokeless tobacco: Never Used   Substance Use Topics   • Alcohol use: No   • Drug use: No       Review of Systems   Constitution: Negative for malaise/fatigue.   Cardiovascular: Positive for chest pain and dyspnea on exertion. Negative for irregular heartbeat.   Respiratory: Negative for cough and shortness of breath.    Hematologic/Lymphatic: Negative for bleeding problem. Does not bruise/bleed easily.   Musculoskeletal: Positive for joint pain.   Gastrointestinal: Negative for nausea and vomiting.   Neurological: Negative for weakness.       Objective   Vitals:    08/24/20 1440   BP: 105/65   BP Location: Right arm   Patient Position: Sitting   Cuff Size: Adult   Pulse: 56   Temp: 97.6 °F (36.4 °C)   TempSrc: Infrared   SpO2: 97%   Weight: 76.2 kg (168 lb)   Height: 158.8 cm (62.5\")     Body mass index is 30.24 kg/m².    Physical Exam   Constitutional: He is oriented to person, place, and time. He appears well-developed and well-nourished. No distress.   HENT:   Head: Normocephalic and atraumatic.   Cardiovascular: Normal rate, regular rhythm and normal heart sounds.   Pulmonary/Chest: Effort normal and breath sounds normal. No respiratory distress.   Musculoskeletal: He exhibits no edema.   Positive for Phalen's sign and Tinel's sign   Neurological: He is alert and oriented to person, place, and time.   Skin: He is not diaphoretic.       Lab Results   Component Value Date     01/10/2020    K 3.8 01/10/2020     01/10/2020    CO2 20.9 (L) 01/10/2020    BUN 14 01/10/2020    CREATININE 1.23 01/10/2020    GLUCOSE 145 (H) 01/10/2020    CALCIUM 9.4 01/10/2020    AST 24 08/03/2018    ALT 21 08/03/2018    ALKPHOS 95 08/03/2018    LABIL2 1.4 (L) 05/16/2015     No results found for: CKTOTAL  Lab Results   Component Value Date    WBC 5.65 01/10/2020    HGB 14.1 " 01/10/2020    HCT 40.6 01/10/2020     01/10/2020     Lab Results   Component Value Date    INR 0.93 01/10/2020    INR 0.92 01/26/2017     No results found for: MG  Lab Results   Component Value Date    TSH 2.913 08/03/2018    PSA 0.430 08/03/2018    CHLPL 207 (H) 05/16/2015    TRIG 180 (H) 08/03/2018    HDL 36 (L) 08/03/2018    LDL 65 08/03/2018      No results found for: BNP    During this visit the following were done:  Labs Reviewed [x]    Labs Ordered []    Radiology Reports Reviewed [x]    Radiology Ordered []    PCP Records Reviewed []    Referring Provider Records Reviewed []    ER Records Reviewed []    Hospital Records Reviewed []    History Obtained From Family []    Radiology Images Reviewed []    Other Reviewed []    Records Requested []         ECG 12 Lead  Date/Time: 8/24/2020 2:38 PM  Performed by: Milton Ruelas PA-C  Authorized by: Milton Ruelas PA-C   Comparison: compared with previous ECG   Rhythm: sinus rhythm  Conduction: conduction normal  ST Elevation: V4, V5 and V6  Other findings: early repolarization    Clinical impression: normal ECG  Comments: No change in EKG with chronic ST elevattion in V4-V6  From early repolarization.             Assessment/Plan    Diagnosis Plan   1. Essential hypertension     2. Dyslipidemia              Recommendations:  1. Overall patient is stable from cardiac standpoint.  I once again encouraged him to speak with his primary care provider for gastroenterology work-up for chest pains.  2. He did have positive Phalen sign and Tinel sign indicating possible carpal tunnel syndrome.  Encouraged him wear night splints when he sleeps and to avoid resting his wrist on steering wheel.       Return in about 6 months (around 2/24/2021).    As always, I appreciate very much the opportunity to participate in the cardiovascular care of your patients.      With Best Regards,    Milton Ruelas PA-C

## 2021-03-01 ENCOUNTER — OFFICE VISIT (OUTPATIENT)
Dept: CARDIOLOGY | Facility: CLINIC | Age: 56
End: 2021-03-01

## 2021-03-01 VITALS
RESPIRATION RATE: 16 BRPM | DIASTOLIC BLOOD PRESSURE: 66 MMHG | WEIGHT: 172 LBS | HEART RATE: 67 BPM | SYSTOLIC BLOOD PRESSURE: 108 MMHG | BODY MASS INDEX: 31.65 KG/M2 | TEMPERATURE: 97.4 F | HEIGHT: 62 IN

## 2021-03-01 DIAGNOSIS — E78.5 DYSLIPIDEMIA: Primary | ICD-10-CM

## 2021-03-01 DIAGNOSIS — I10 ESSENTIAL HYPERTENSION: ICD-10-CM

## 2021-03-01 PROCEDURE — 99213 OFFICE O/P EST LOW 20 MIN: CPT | Performed by: PHYSICIAN ASSISTANT

## 2021-03-01 PROCEDURE — 93000 ELECTROCARDIOGRAM COMPLETE: CPT | Performed by: PHYSICIAN ASSISTANT

## 2021-03-01 NOTE — PROGRESS NOTES
Wandy Fragoso DO  Asif Morin  1965  03/01/2021    Patient Active Problem List   Diagnosis   • Essential hypertension   • Dyslipidemia   • Precordial pain   • Abnormal nuclear stress test   • Angina, class III (CMS/HCC)       Dear Wandy Fragoso DO:    Subjective     History of Present Illness:    Chief Complaint   Patient presents with   • Hypertension     6 mos follow   • Med Management     verbal        Asif Morin is a pleasant 55 y.o. male with a past medical history significant for chronic chest pains with normal LHC on 1/13/2020. He does have history of GERD, essential hypertension, and dyslipidemia. He comes in for routine cardiology follow up.    Mr. Gold denies any cardiac problems with open questions. Upon further question patient denies any chest pain, shortness of breath, palpitations, dizziness, syncope or near syncope. His blood pressure is doing well too.     Allergies   Allergen Reactions   • Bee Venom Swelling and Rash   :      Current Outpatient Medications:   •  ALBUTEROL IN, Inhale As Needed., Disp: , Rfl:   •  atorvastatin (LIPITOR) 80 MG tablet, Take 80 mg by mouth Daily., Disp: , Rfl:   •  ibuprofen (ADVIL,MOTRIN) 800 MG tablet, Take 1 tablet by mouth every 6 (six) hours as needed for mild pain (1-3)., Disp: 30 tablet, Rfl: 0  •  lisinopril (PRINIVIL,ZESTRIL) 30 MG tablet, Take 30 mg by mouth Daily., Disp: , Rfl:   •  metoprolol tartrate (LOPRESSOR) 25 MG tablet, Take 25 mg by mouth 2 (Two) Times a Day., Disp: , Rfl:   •  nitroglycerin (NITROSTAT) 0.4 MG SL tablet, , Disp: , Rfl:   •  omeprazole (priLOSEC) 40 MG capsule, Take 40 mg by mouth Daily., Disp: , Rfl:     The following portions of the patient's history were reviewed and updated as appropriate: allergies, current medications, past family history, past medical history, past social history, past surgical history and problem list.    Social History     Tobacco Use   • Smoking status: Former Smoker      "Packs/day: 1.00   • Smokeless tobacco: Never Used   Substance Use Topics   • Alcohol use: No   • Drug use: No       Review of Systems   Cardiovascular: Negative for chest pain, leg swelling and palpitations.   Respiratory: Negative for shortness of breath.        Objective   Vitals:    03/01/21 1421   BP: 108/66   Pulse: 67   Resp: 16   Temp: 97.4 °F (36.3 °C)   Weight: 78 kg (172 lb)   Height: 157.5 cm (62\")     Body mass index is 31.46 kg/m².    Constitutional:       General: Not in acute distress.     Appearance: Healthy appearance. Well-developed and not in distress. Not diaphoretic.   Eyes:      Conjunctiva/sclera: Conjunctivae normal.      Pupils: Pupils are equal, round, and reactive to light.   HENT:      Head: Normocephalic and atraumatic.   Neck:      Vascular: No carotid bruit or JVD.   Pulmonary:      Effort: Pulmonary effort is normal. No respiratory distress.      Breath sounds: Normal breath sounds.   Cardiovascular:      Normal rate. Regular rhythm.   Skin:     General: Skin is cool.   Neurological:      Mental Status: Alert, oriented to person, place, and time and oriented to person, place and time.         Lab Results   Component Value Date     01/10/2020    K 3.8 01/10/2020     01/10/2020    CO2 20.9 (L) 01/10/2020    BUN 14 01/10/2020    CREATININE 1.23 01/10/2020    GLUCOSE 145 (H) 01/10/2020    CALCIUM 9.4 01/10/2020    AST 24 08/03/2018    ALT 21 08/03/2018    ALKPHOS 95 08/03/2018    LABIL2 1.4 (L) 05/16/2015     No results found for: CKTOTAL  Lab Results   Component Value Date    WBC 5.65 01/10/2020    HGB 14.1 01/10/2020    HCT 40.6 01/10/2020     01/10/2020     Lab Results   Component Value Date    INR 0.93 01/10/2020    INR 0.92 01/26/2017     No results found for: MG  Lab Results   Component Value Date    TSH 2.913 08/03/2018    PSA 0.430 08/03/2018    CHLPL 207 (H) 05/16/2015    TRIG 180 (H) 08/03/2018    HDL 36 (L) 08/03/2018    LDL 65 08/03/2018      No results " found for: BNP    During this visit the following were done:  Labs Reviewed [x]    Labs Ordered []    Radiology Reports Reviewed [x]    Radiology Ordered []    PCP Records Reviewed []    Referring Provider Records Reviewed []    ER Records Reviewed []    Hospital Records Reviewed []    History Obtained From Family []    Radiology Images Reviewed []    Other Reviewed []    Records Requested []         ECG 12 Lead    Date/Time: 3/1/2021 2:22 PM  Performed by: Milton Ruelas PA-C  Authorized by: Milton Ruelas PA-C   Comparison: compared with previous ECG   Similar to previous ECG  Rhythm: sinus rhythm  Other findings: early repolarization    Clinical impression: normal ECG            Assessment/Plan    Diagnosis Plan   1. Dyslipidemia  Lipid Panel   2. Essential hypertension              Recommendations:  1. Essential hypertension  1. Blood pressure well controlled.  We will continue lisinopril and metoprolol.  2. Dyslipidemia  1. We will check lipid panel and continue atorvastatin for now.      Return in about 1 year (around 3/1/2022).    As always, I appreciate very much the opportunity to participate in the cardiovascular care of your patients.      With Best Regards,    Milton Ruelas PA-C

## 2021-06-24 ENCOUNTER — OFFICE VISIT (OUTPATIENT)
Dept: SURGERY | Facility: CLINIC | Age: 56
End: 2021-06-24

## 2021-06-24 VITALS
BODY MASS INDEX: 32.31 KG/M2 | HEART RATE: 59 BPM | SYSTOLIC BLOOD PRESSURE: 122 MMHG | WEIGHT: 175.6 LBS | DIASTOLIC BLOOD PRESSURE: 74 MMHG | HEIGHT: 62 IN

## 2021-06-24 DIAGNOSIS — L72.3 SEBACEOUS CYST: Primary | ICD-10-CM

## 2021-06-24 PROCEDURE — 99202 OFFICE O/P NEW SF 15 MIN: CPT | Performed by: SURGERY

## 2021-06-24 NOTE — PROGRESS NOTES
"Subjective   Asif Rufino Morin is a 55 y.o. male her today for place on face.    History of Present Illness  Mr. Morin was seen in the office today for evaluation of a sebaceous cyst on the right side of his face.  This had been previously excised and the office of his primary care provider but the patient had an early recurrence of the cyst.  Allergies   Allergen Reactions   • Bee Venom Swelling and Rash     Current Outpatient Medications   Medication Sig Dispense Refill   • ALBUTEROL IN Inhale As Needed.     • atorvastatin (LIPITOR) 80 MG tablet Take 80 mg by mouth Daily.     • ibuprofen (ADVIL,MOTRIN) 800 MG tablet Take 1 tablet by mouth every 6 (six) hours as needed for mild pain (1-3). 30 tablet 0   • lisinopril (PRINIVIL,ZESTRIL) 30 MG tablet Take 30 mg by mouth Daily.     • metoprolol tartrate (LOPRESSOR) 25 MG tablet Take 25 mg by mouth 2 (Two) Times a Day.     • nitroglycerin (NITROSTAT) 0.4 MG SL tablet      • omeprazole (priLOSEC) 40 MG capsule Take 40 mg by mouth Daily.       No current facility-administered medications for this visit.     Past Medical History:   Diagnosis Date   • Arthritis    • Cancer (CMS/HCC)     SKIN CANCER   • COPD (chronic obstructive pulmonary disease) (CMS/HCC)    • Heart disease    • Hyperlipidemia    • Hypertension    • Myocardial infarction (CMS/HCC)    • Stroke (CMS/HCC)      Past Surgical History:   Procedure Laterality Date   • CARDIAC CATHETERIZATION N/A 1/13/2020    Procedure: Left Heart Cath;  Surgeon: Siddhartha Butler MD;  Location: [facility] CATH INVASIVE LOC[facility];  Service: Cardiology   • SKIN CANCER EXCISION      left elbow       Pertinent Review of Systems:  Respiratory: no shortness of breath  Cardiovascular: no chest pain  Other pertinent:      Objective   /74 (BP Location: Left arm)   Pulse 59   Ht 157.5 cm (62\")   Wt 79.7 kg (175 lb 9.6 oz)   BMI 32.12 kg/m²   Physical Exam  On the right side of the face in the preauricular area associated with the " previous scar is a 6 mm noninfected sebaceous cyst  Procedures     Results/Data:  Imaging:   Notes:   Lab:   Other:     Assessment/Plan   6 mm noninfected sebaceous cyst of the right side of the face, residual/recurrent post excision    Patient to return to the office post authorization for office resection of the residual sebaceous cyst         Discussion/Summary    Time spent:     Patient's Body mass index is 32.12 kg/m². indicating that he is overweight (BMI 25-29.9). Obesity-related health conditions include the following: none. Obesity is newly identified. BMI is is above average; BMI management plan is completed. We discussed portion control and increasing exercise..       Future Appointments   Date Time Provider Department Center   3/1/2022  2:00 PM Milton Ruelas, [facility]         Please note that portions of this note were completed with a voice recognition program.

## 2021-06-26 PROBLEM — L72.3 SEBACEOUS CYST: Status: ACTIVE | Noted: 2021-06-26

## 2021-07-08 ENCOUNTER — OFFICE VISIT (OUTPATIENT)
Dept: SURGERY | Facility: CLINIC | Age: 56
End: 2021-07-08

## 2021-07-08 VITALS
WEIGHT: 175.6 LBS | SYSTOLIC BLOOD PRESSURE: 108 MMHG | BODY MASS INDEX: 32.31 KG/M2 | HEART RATE: 78 BPM | HEIGHT: 62 IN | DIASTOLIC BLOOD PRESSURE: 64 MMHG

## 2021-07-08 DIAGNOSIS — L72.3 SEBACEOUS CYST: Primary | ICD-10-CM

## 2021-07-08 PROCEDURE — 99212 OFFICE O/P EST SF 10 MIN: CPT | Performed by: SURGERY

## 2021-07-08 NOTE — PROGRESS NOTES
"Subjective   Asif Rufino Morin is a 55 y.o. male here to have place on face removed.    History of Present Illness  Mr. Morin was seen in the office today for excision of a skin lesion on the right side of his face which had been previously excised but recurred.  Patient was under the impression this was a sebaceous cyst.  Allergies   Allergen Reactions   • Bee Venom Swelling and Rash       Current Outpatient Medications   Medication Sig Dispense Refill   • ALBUTEROL IN Inhale As Needed.     • atorvastatin (LIPITOR) 80 MG tablet Take 80 mg by mouth Daily.     • ibuprofen (ADVIL,MOTRIN) 800 MG tablet Take 1 tablet by mouth every 6 (six) hours as needed for mild pain (1-3). 30 tablet 0   • lisinopril (PRINIVIL,ZESTRIL) 30 MG tablet Take 30 mg by mouth Daily.     • metoprolol tartrate (LOPRESSOR) 25 MG tablet Take 25 mg by mouth 2 (Two) Times a Day.     • nitroglycerin (NITROSTAT) 0.4 MG SL tablet      • omeprazole (priLOSEC) 40 MG capsule Take 40 mg by mouth Daily.       No current facility-administered medications for this visit.     Past Medical History:   Diagnosis Date   • Arthritis    • Cancer (CMS/HCC)     SKIN CANCER   • COPD (chronic obstructive pulmonary disease) (CMS/HCC)    • Heart disease    • Hyperlipidemia    • Hypertension    • Myocardial infarction (CMS/HCC)    • Stroke (CMS/HCC)      Past Surgical History:   Procedure Laterality Date   • CARDIAC CATHETERIZATION N/A 1/13/2020    Procedure: Left Heart Cath;  Surgeon: Siddhartha Butler MD;  Location: [facility] CATH INVASIVE LOCATION;  Service: Cardiology   • SKIN CANCER EXCISION      left elbow       Pertinent Review of Systems      Objective   /64 (BP Location: Left arm)   Pulse 78   Ht 157.5 cm (62\")   Wt 79.7 kg (175 lb 9.6 oz)   BMI 32.12 kg/m²    Physical Exam  On the right side of the face in the preauricular area associated with the previous scar is a 6 mm irregular firm skin lesion.  This does not definitely meet the criteria of a " sebaceous cyst    Procedures   Procedure Note    Procedure: Skin lesion    Location: Right temporal area    Anesthesia: 1% lidocaine with epinephrine    Description:  The risks and benefits of the procedure were discussed.  After prep with Betadine an elliptical incision was made over the lesion and carried down into the subcu.  Lesion was excised to grossly clear margins.  The incision was closed in a 2 layer closure of a deep 3-0 Vicryl suture followed by 4-0 Monocryl.  Length of incision was 1.2 cm.  Steri-Strips were placed for dressing    Complications:  none    Follow-up: Pending pathology  Results/Data:    Assessment/Plan   Residual skin lesion of right temple area.  This most likely represents residual sebaceous cyst but given that it had some irregularity today the specimen was sent for pathology.    Pathology result will be tracked and patient will be called with the result       Discussion/Summary:    Time spent:     Patient's Body mass index is 32.12 kg/m². indicating that he is overweight (BMI 25-29.9). Obesity-related health conditions include the following: none. Obesity is improving with lifestyle modifications. BMI is is above average; BMI management plan is completed. We discussed portion control and increasing exercise..         Future Appointments   Date Time Provider Department Center   3/1/2022  2:00 PM Milton Ruelas, RA[facility]         Please note that portions of this note were completed with a voice recognition program.

## 2021-07-13 ENCOUNTER — TELEPHONE (OUTPATIENT)
Dept: SURGERY | Facility: CLINIC | Age: 56
End: 2021-07-13

## 2021-07-13 NOTE — TELEPHONE ENCOUNTER
Informed Asif that pathology showed squamous cell carcinoma and that Dr. Piper wants him to come back in a month to talk about surgical excision. Appointment was made and patient stated he understood.

## 2021-08-12 ENCOUNTER — OFFICE VISIT (OUTPATIENT)
Dept: SURGERY | Facility: CLINIC | Age: 56
End: 2021-08-12

## 2021-08-12 VITALS
HEIGHT: 62 IN | WEIGHT: 177.2 LBS | HEART RATE: 68 BPM | BODY MASS INDEX: 32.61 KG/M2 | DIASTOLIC BLOOD PRESSURE: 74 MMHG | SYSTOLIC BLOOD PRESSURE: 126 MMHG

## 2021-08-12 DIAGNOSIS — C44.320 SQUAMOUS CELL SKIN CANCER, FACE: Primary | ICD-10-CM

## 2021-08-12 PROCEDURE — 17261 DSTRJ MAL LES T/A/L .6-1.0CM: CPT | Performed by: SURGERY

## 2021-08-12 PROCEDURE — 99212 OFFICE O/P EST SF 10 MIN: CPT | Performed by: SURGERY

## 2021-08-12 NOTE — PROGRESS NOTES
"Subjective   Asif Rufino Morin is a 55 y.o. male follow up and pathology report.    History of Present Illness  Mr. Morin was seen in the office today for follow-up of squamous cell carcinoma of the face.  He was initially seen on 7/8/2021 for what was felt to be a residual sebaceous cyst but on excision he was noted to have squamous cell carcinoma with tumor focally at the margin.  He presents now for follow-up  Allergies   Allergen Reactions   • Bee Venom Swelling and Rash         Current Outpatient Medications   Medication Sig Dispense Refill   • ALBUTEROL IN Inhale As Needed.     • atorvastatin (LIPITOR) 80 MG tablet Take 80 mg by mouth Daily.     • ibuprofen (ADVIL,MOTRIN) 800 MG tablet Take 1 tablet by mouth every 6 (six) hours as needed for mild pain (1-3). 30 tablet 0   • lisinopril (PRINIVIL,ZESTRIL) 30 MG tablet Take 30 mg by mouth Daily.     • metoprolol tartrate (LOPRESSOR) 25 MG tablet Take 25 mg by mouth 2 (Two) Times a Day.     • nitroglycerin (NITROSTAT) 0.4 MG SL tablet      • omeprazole (priLOSEC) 40 MG capsule Take 40 mg by mouth Daily.       No current facility-administered medications for this visit.       Objective   /74 (BP Location: Left arm)   Pulse 68   Ht 157.5 cm (62\")   Wt 80.4 kg (177 lb 3.2 oz)   BMI 32.41 kg/m²    Physical Exam  On exam of the right side of the face there is a scar from the recent excision.  No obvious recurrence.  Results/Data  Pathology result was reviewed and discussed with the patient    Procedures   CryoPen Treatment    Location: Right face    Diagnosis: Squamous cell carcinoma with positive margin on surgical excision    Lesion size: Unknown    Pen tip size: 10 mm    Treatment duration: 60 seconds    Assessment/Plan   Squamous cell carcinoma of the right side of the face.  As margin was focally positive on excision went ahead and treated the area with cryo pen.  Patient advised to monitor for evidence of local recurrence    Follow-up with me as " needed       Discussion/Summary  Patient's Body mass index is 32.41 kg/m². indicating that he is overweight (BMI 25-29.9). Obesity-related health conditions include the following: none. Obesity is improving with lifestyle modifications. BMI is is above average; BMI management plan is completed. We discussed portion control and increasing exercise..       Future Appointments   Date Time Provider Department Center   8/12/2021  8:50 AM [facility]   3/1/2022  2:00 PM Milton Ruelas PA-C [facility]         Please note that portions of this note were completed with a voice recognition program.

## 2021-10-07 ENCOUNTER — OFFICE VISIT (OUTPATIENT)
Dept: SURGERY | Facility: CLINIC | Age: 56
End: 2021-10-07

## 2021-10-07 VITALS
WEIGHT: 178.2 LBS | HEIGHT: 62 IN | BODY MASS INDEX: 32.79 KG/M2 | DIASTOLIC BLOOD PRESSURE: 76 MMHG | SYSTOLIC BLOOD PRESSURE: 122 MMHG | HEART RATE: 56 BPM

## 2021-10-07 DIAGNOSIS — C44.90 SKIN CANCER: Primary | ICD-10-CM

## 2021-10-07 PROCEDURE — 99212 OFFICE O/P EST SF 10 MIN: CPT | Performed by: SURGERY

## 2021-10-07 NOTE — PROGRESS NOTES
"Subjective   Asif Rufino Morin is a 55 y.o. male here today for two places.    History of Present Illness  Mr. Morin was seen in the office today for evaluation of 2 new lesions which developed in the last month or 2.  One is on the left posterior neck and the other is on the dorsum of the right hand.  He states these are raised and are similar in appearance to his skin cancer of the face that was treated earlier this year.  Allergies   Allergen Reactions   • Bee Venom Swelling and Rash       Current Outpatient Medications   Medication Sig Dispense Refill   • ALBUTEROL IN Inhale As Needed.     • atorvastatin (LIPITOR) 80 MG tablet Take 80 mg by mouth Daily.     • ibuprofen (ADVIL,MOTRIN) 800 MG tablet Take 1 tablet by mouth every 6 (six) hours as needed for mild pain (1-3). 30 tablet 0   • lisinopril (PRINIVIL,ZESTRIL) 30 MG tablet Take 30 mg by mouth Daily.     • metoprolol tartrate (LOPRESSOR) 25 MG tablet Take 25 mg by mouth 2 (Two) Times a Day.     • nitroglycerin (NITROSTAT) 0.4 MG SL tablet      • omeprazole (priLOSEC) 40 MG capsule Take 40 mg by mouth Daily.       No current facility-administered medications for this visit.     Past Medical History:   Diagnosis Date   • Arthritis    • Cancer (HCC)     SKIN CANCER   • COPD (chronic obstructive pulmonary disease) (HCC)    • Heart disease    • Hyperlipidemia    • Hypertension    • Myocardial infarction (HCC)    • Stroke (HCC)      Past Surgical History:   Procedure Laterality Date   • CARDIAC CATHETERIZATION N/A 1/13/2020    Procedure: Left Heart Cath;  Surgeon: Siddhartha Butler MD;  Location: [facility] INVASIVE LOCATION;  Service: Cardiology   • SKIN CANCER EXCISION      left elbow       Pertinent Review of Systems    Objective   /76 (BP Location: Left arm)   Pulse 56   Ht 157.5 cm (62\")   Wt 80.8 kg (178 lb 3.2 oz)   BMI 32.59 kg/m²    Physical Exam  On examination this is a well-developed well-nourished male in no acute distress  HEENT " examination: Within normal limits.  Conjunctiva pink.  Nose and ears appear normal.  Neck: Supple, full range of motion.  No JVD.  Musculoskeletal: Full range of motion all extremities without focal weakness. Normal gait. No digital cyanosis.  Psych: Patient is alert, oriented x3. Mood and affect are appropriate.  Skin: On the right side of the face at the prior excision there is may be a 1 mm nodule at the lateral aspect which may be an early recurrence in which will be treated with cryo pen at the follow-up visit.  On the dorsum of the right wrist there is a 9 mm raised lesion consistent with a squamous cell/basal cell carcinoma.  On the left posterior neck there is a 5 mm lesion consistent with a basal/squamous cell carcinoma.  Procedures     Results/Data:  Imaging:   Notes:  Lab:   Other:     Assessment/Plan   Clinical basal/squamous cell carcinomas of the dorsum of the right hand and left posterior neck    Plan: Patient was given follow-up appointment to return to the office for excision.  At that time we will also treat the questionable area at the facial lesion with cryo pen.       Discussion/Summary:    Time spent:     Patient's Body mass index is 32.59 kg/m². indicating that he is overweight (BMI 25-29.9). Obesity-related health conditions include the following: none. Obesity is improving with lifestyle modifications. BMI is is above average; BMI management plan is completed. We discussed portion control and increasing exercise..         Future Appointments   Date Time Provider Department Center   3/1/2022  2:00 PM Milton Ruelas PA-C [facility]         Please note that portions of this note were completed with a voice recognition program.

## 2021-10-21 ENCOUNTER — PROCEDURE VISIT (OUTPATIENT)
Dept: SURGERY | Facility: CLINIC | Age: 56
End: 2021-10-21

## 2021-10-21 VITALS
SYSTOLIC BLOOD PRESSURE: 108 MMHG | DIASTOLIC BLOOD PRESSURE: 64 MMHG | WEIGHT: 177.2 LBS | HEIGHT: 62 IN | BODY MASS INDEX: 32.61 KG/M2 | HEART RATE: 52 BPM

## 2021-10-21 DIAGNOSIS — D48.5 NEOPLASM OF UNCERTAIN BEHAVIOR OF SKIN: ICD-10-CM

## 2021-10-21 DIAGNOSIS — C44.90 SKIN CANCER: Primary | ICD-10-CM

## 2021-10-21 PROCEDURE — 17260 DSTRJ MAL LES T/A/L 0.5 CM/<: CPT | Performed by: SURGERY

## 2021-10-21 NOTE — PROGRESS NOTES
"Subjective   Asif Rufino Morin is a 56 y.o. male here today for office procedure.    History of Present Illness  Mr. Morin returns to the office today for excision of 2 skin lesions which are clinically suspicious for skin cancers as well as for cryo pen treatment of a small raised area where he had a skin cancer excised earlier this year.  Patient reports no change in his exam since his last visit of 10/7/2021  Allergies   Allergen Reactions   • Bee Venom Swelling and Rash       Current Outpatient Medications   Medication Sig Dispense Refill   • ALBUTEROL IN Inhale As Needed.     • atorvastatin (LIPITOR) 80 MG tablet Take 80 mg by mouth Daily.     • ibuprofen (ADVIL,MOTRIN) 800 MG tablet Take 1 tablet by mouth every 6 (six) hours as needed for mild pain (1-3). 30 tablet 0   • lisinopril (PRINIVIL,ZESTRIL) 30 MG tablet Take 30 mg by mouth Daily.     • metoprolol tartrate (LOPRESSOR) 25 MG tablet Take 25 mg by mouth 2 (Two) Times a Day.     • nitroglycerin (NITROSTAT) 0.4 MG SL tablet      • omeprazole (priLOSEC) 40 MG capsule Take 40 mg by mouth Daily.       No current facility-administered medications for this visit.     Past Medical History:   Diagnosis Date   • Arthritis    • Cancer (HCC)     SKIN CANCER   • COPD (chronic obstructive pulmonary disease) (HCC)    • Heart disease    • Hyperlipidemia    • Hypertension    • Myocardial infarction (HCC)    • Stroke (HCC)      Past Surgical History:   Procedure Laterality Date   • CARDIAC CATHETERIZATION N/A 1/13/2020    Procedure: Left Heart Cath;  Surgeon: Siddhartha Butler MD;  Location: [facility] CATH INVASIVE LOCATION;  Service: Cardiology   • SKIN CANCER EXCISION      left elbow       Pertinent Review of Systems      Objective   /64 (BP Location: Left arm)   Pulse 52   Ht 157.5 cm (62\")   Wt 80.4 kg (177 lb 3.2 oz)   BMI 32.41 kg/m²    Physical Exam  Skin: On the right side of the face at the prior excision there is may be a 1 mm nodule at the lateral " aspect which may be an early recurrence in which will be treated with cryo pen.  On the dorsum of the right wrist there is an 8 mm raised lesion consistent with a squamous cell/basal cell carcinoma.  On the left posterior neck there is a 7 mm lesion consistent with a basal/squamous cell carcinoma.    Procedures   Procedure Note    Procedure: Excision lesions suspicious for squamous/basal cell carcinoma    Location: Left posterior neck, right dorsum of hand    Anesthesia: 1 Percent lidocaine with epinephrine    Description:  The risks and benefits of the procedure were discussed.  After infiltration with lidocaine and prepped with Betadine both lesions were excised circumferentially to grossly clear margins.  Both were closed in a 2 layer closure of interrupted 3-0 Vicryl subdermal sutures, followed by 4-0 Monocryl.  Length on the neck incision was 1.6 cm and of the hand 1.8 cm.  Dressings were placed.  Both lesions sent for pathology    Complications:  none    Follow-up: Pending pathology      CryoPen Treatment    Location: Right face    Diagnosis: Small residual/recurrent basal cell carcinoma    Lesion size: 1 to 2 mm    Pen tip size: 3 mm    Treatment duration: 40 seconds    Results/Data:  Imaging:   Notes:   Lab:   Other:     Assessment/Plan   Status post excision of 2 probable basal/squamous cell carcinomas and cryo pen treatment of a possible residual lesion on the right face    Pathology results will be tracked       Discussion/Summary:    Time spent:     Patient's Body mass index is 32.41 kg/m². indicating that he is overweight (BMI 25-29.9). Obesity-related health conditions include the following: none. Obesity is improving with lifestyle modifications. BMI is is above average; BMI management plan is completed. We discussed portion control and increasing exercise..         Future Appointments   Date Time Provider Department Center   3/1/2022  2:00 PM Milton Ruelas PA-C [facility]         Please  note that portions of this note were completed with a voice recognition program.

## 2021-10-28 ENCOUNTER — OFFICE VISIT (OUTPATIENT)
Dept: SURGERY | Facility: CLINIC | Age: 56
End: 2021-10-28

## 2021-10-28 VITALS
BODY MASS INDEX: 32.54 KG/M2 | WEIGHT: 176.8 LBS | HEIGHT: 62 IN | DIASTOLIC BLOOD PRESSURE: 72 MMHG | HEART RATE: 62 BPM | SYSTOLIC BLOOD PRESSURE: 118 MMHG

## 2021-10-28 DIAGNOSIS — C44.92 SQUAMOUS CELL CARCINOMA OF SKIN: ICD-10-CM

## 2021-10-28 DIAGNOSIS — Z09 POSTOP CHECK: Primary | ICD-10-CM

## 2021-10-28 PROCEDURE — 99024 POSTOP FOLLOW-UP VISIT: CPT | Performed by: SURGERY

## 2021-10-28 NOTE — PROGRESS NOTES
"Subjective   Asif Rufino Morin is a 56 y.o. male here today for pathology review.    History of Present Illness  Mr. Morin was seen in the office today for postoperative visit following excision of 2 skin lesions on the right dorsum of the hand and the left posterior neck.  Both returned squamous cell carcinomas with margins of resections clear.  Patient states the posterior neck incision feels lumpy but otherwise is having no complaints.  Allergies   Allergen Reactions   • Bee Venom Swelling and Rash       Current Outpatient Medications   Medication Sig Dispense Refill   • ALBUTEROL IN Inhale As Needed.     • atorvastatin (LIPITOR) 80 MG tablet Take 80 mg by mouth Daily.     • ibuprofen (ADVIL,MOTRIN) 800 MG tablet Take 1 tablet by mouth every 6 (six) hours as needed for mild pain (1-3). 30 tablet 0   • lisinopril (PRINIVIL,ZESTRIL) 30 MG tablet Take 30 mg by mouth Daily.     • metoprolol tartrate (LOPRESSOR) 25 MG tablet Take 25 mg by mouth 2 (Two) Times a Day.     • nitroglycerin (NITROSTAT) 0.4 MG SL tablet      • omeprazole (priLOSEC) 40 MG capsule Take 40 mg by mouth Daily.       No current facility-administered medications for this visit.     Past Medical History:   Diagnosis Date   • Arthritis    • Cancer (HCC)     SKIN CANCER   • COPD (chronic obstructive pulmonary disease) (HCC)    • Heart disease    • Hyperlipidemia    • Hypertension    • Myocardial infarction (HCC)    • Stroke (HCC)      Past Surgical History:   Procedure Laterality Date   • CARDIAC CATHETERIZATION N/A 1/13/2020    Procedure: Left Heart Cath;  Surgeon: Siddhartha Butler MD;  Location: [facility] CATH INVASIVE LOCATION;  Service: Cardiology   • SKIN CANCER EXCISION      left elbow       Pertinent Review of Systems      Objective   /72 (BP Location: Left arm)   Pulse 62   Ht 157.5 cm (62\")   Wt 80.2 kg (176 lb 12.8 oz)   BMI 32.34 kg/m²    Physical Exam  On examination of the skin of the dorsum of the hand and the left posterior " neck there are healing incisions with no erythema, drainage, cellulitis  Procedures     Results/Data:  Imaging:   Notes:   Lab:   Other: Pathology report was reviewed as well as being discussed with the patient and a copy given to him    Assessment/Plan   Status post excision of squamous cell carcinoma of the dorsum of the right hand and the left posterior neck    Follow-up as needed       Discussion/Summary:    Time spent:     Patient's Body mass index is 32.34 kg/m². indicating that he is overweight (BMI 25-29.9). Obesity-related health conditions include the following: none. Obesity is improving with lifestyle modifications. BMI is is above average; BMI management plan is completed. We discussed portion control and increasing exercise..         Future Appointments   Date Time Provider Department Center   3/1/2022  2:00 PM Milton Ruelas PA-C [facility]         Please note that portions of this note were completed with a voice recognition program.

## 2022-03-23 ENCOUNTER — OFFICE VISIT (OUTPATIENT)
Dept: CARDIOLOGY | Facility: CLINIC | Age: 57
End: 2022-03-23

## 2022-03-23 VITALS
HEART RATE: 78 BPM | WEIGHT: 173.8 LBS | TEMPERATURE: 97.3 F | BODY MASS INDEX: 31.98 KG/M2 | DIASTOLIC BLOOD PRESSURE: 87 MMHG | SYSTOLIC BLOOD PRESSURE: 130 MMHG | HEIGHT: 62 IN

## 2022-03-23 DIAGNOSIS — R07.2 PRECORDIAL PAIN: Primary | ICD-10-CM

## 2022-03-23 DIAGNOSIS — I10 ESSENTIAL HYPERTENSION: ICD-10-CM

## 2022-03-23 PROCEDURE — 99214 OFFICE O/P EST MOD 30 MIN: CPT | Performed by: PHYSICIAN ASSISTANT

## 2022-03-23 PROCEDURE — 93000 ELECTROCARDIOGRAM COMPLETE: CPT | Performed by: PHYSICIAN ASSISTANT

## 2022-03-23 RX ORDER — NITROGLYCERIN 0.4 MG/1
0.4 TABLET SUBLINGUAL
Qty: 25 TABLET | Refills: 0 | Status: SHIPPED | OUTPATIENT
Start: 2022-03-23 | End: 2022-09-28
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
## 2022-04-19 ENCOUNTER — HOSPITAL ENCOUNTER (OUTPATIENT)
Dept: NUCLEAR MEDICINE | Facility: HOSPITAL | Age: 57
Discharge: HOME | End: 2022-04-19

## 2022-04-19 ENCOUNTER — LAB (OUTPATIENT)
Dept: LAB | Facility: HOSPITAL | Age: 57
End: 2022-04-19

## 2022-04-19 ENCOUNTER — HOSPITAL ENCOUNTER (OUTPATIENT)
Dept: CARDIOLOGY | Facility: HOSPITAL | Age: 57
Discharge: HOME | End: 2022-04-19

## 2022-04-19 DIAGNOSIS — R07.2 PRECORDIAL PAIN: ICD-10-CM

## 2022-04-19 LAB
ALBUMIN SERPL-MCNC: 4.1 G/DL (ref 3.5–5.2)
ALBUMIN/GLOB SERPL: 1.3 G/DL
ALP SERPL-CCNC: 85 U/L (ref 39–117)
ALT SERPL W P-5'-P-CCNC: 20 U/L (ref 1–41)
ANION GAP SERPL CALCULATED.3IONS-SCNC: 12 MMOL/L (ref 5–15)
AST SERPL-CCNC: 23 U/L (ref 1–40)
BILIRUB SERPL-MCNC: 0.4 MG/DL (ref 0–1.2)
BUN SERPL-MCNC: 12 MG/DL (ref 6–20)
BUN/CREAT SERPL: 9.9 (ref 7–25)
CALCIUM SPEC-SCNC: 9.2 MG/DL (ref 8.6–10.5)
CHLORIDE SERPL-SCNC: 105 MMOL/L (ref 98–107)
CHOLEST SERPL-MCNC: 194 MG/DL (ref 0–200)
CO2 SERPL-SCNC: 21 MMOL/L (ref 22–29)
CREAT SERPL-MCNC: 1.21 MG/DL (ref 0.76–1.27)
DEPRECATED RDW RBC AUTO: 43.8 FL (ref 37–54)
EGFRCR SERPLBLD CKD-EPI 2021: 70.7 ML/MIN/1.73
ERYTHROCYTE [DISTWIDTH] IN BLOOD BY AUTOMATED COUNT: 12.6 % (ref 12.3–15.4)
GLOBULIN UR ELPH-MCNC: 3.1 GM/DL
GLUCOSE SERPL-MCNC: 90 MG/DL (ref 65–99)
HCT VFR BLD AUTO: 42.6 % (ref 37.5–51)
HDLC SERPL-MCNC: 40 MG/DL (ref 40–60)
HGB BLD-MCNC: 14.4 G/DL (ref 13–17.7)
LDLC SERPL CALC-MCNC: 126 MG/DL (ref 0–100)
LDLC/HDLC SERPL: 3.07 {RATIO}
MCH RBC QN AUTO: 31.9 PG (ref 26.6–33)
MCHC RBC AUTO-ENTMCNC: 33.8 G/DL (ref 31.5–35.7)
MCV RBC AUTO: 94.5 FL (ref 79–97)
PLATELET # BLD AUTO: 277 10*3/MM3 (ref 140–450)
PMV BLD AUTO: 9.9 FL (ref 6–12)
POTASSIUM SERPL-SCNC: 4.1 MMOL/L (ref 3.5–5.2)
PROT SERPL-MCNC: 7.2 G/DL (ref 6–8.5)
RBC # BLD AUTO: 4.51 10*6/MM3 (ref 4.14–5.8)
SODIUM SERPL-SCNC: 138 MMOL/L (ref 136–145)
TRIGL SERPL-MCNC: 156 MG/DL (ref 0–150)
VLDLC SERPL-MCNC: 28 MG/DL (ref 5–40)
WBC NRBC COR # BLD: 5.36 10*3/MM3 (ref 3.4–10.8)

## 2022-04-19 PROCEDURE — 93018 CV STRESS TEST I&R ONLY: CPT | Performed by: INTERNAL MEDICINE

## 2022-04-19 PROCEDURE — 93306 TTE W/DOPPLER COMPLETE: CPT | Performed by: INTERNAL MEDICINE

## 2022-04-19 PROCEDURE — A9500 TC99M SESTAMIBI: HCPCS | Performed by: PHYSICIAN ASSISTANT

## 2022-04-19 PROCEDURE — 78452 HT MUSCLE IMAGE SPECT MULT: CPT

## 2022-04-19 PROCEDURE — 0 TECHNETIUM SESTAMIBI: Performed by: PHYSICIAN ASSISTANT

## 2022-04-19 PROCEDURE — 80061 LIPID PANEL: CPT

## 2022-04-19 PROCEDURE — 78452 HT MUSCLE IMAGE SPECT MULT: CPT | Performed by: INTERNAL MEDICINE

## 2022-04-19 PROCEDURE — 93306 TTE W/DOPPLER COMPLETE: CPT

## 2022-04-19 PROCEDURE — 93017 CV STRESS TEST TRACING ONLY: CPT

## 2022-04-19 PROCEDURE — 36415 COLL VENOUS BLD VENIPUNCTURE: CPT

## 2022-04-19 PROCEDURE — 85027 COMPLETE CBC AUTOMATED: CPT

## 2022-04-19 PROCEDURE — 80053 COMPREHEN METABOLIC PANEL: CPT

## 2022-04-19 RX ADMIN — TECHNETIUM TC 99M SESTAMIBI 1 DOSE: 1 INJECTION INTRAVENOUS at 07:40

## 2022-04-19 RX ADMIN — TECHNETIUM TC 99M SESTAMIBI 1 DOSE: 1 INJECTION INTRAVENOUS at 09:37

## 2022-04-20 DIAGNOSIS — E78.5 DYSLIPIDEMIA: Primary | ICD-10-CM

## 2022-04-20 LAB
BH CV NUCLEAR PRIOR STUDY: 3
BH CV REST NUCLEAR ISOTOPE DOSE: 10.1 MCI
BH CV STRESS BP STAGE 1: NORMAL
BH CV STRESS BP STAGE 2: NORMAL
BH CV STRESS BP STAGE 3: NORMAL
BH CV STRESS DURATION MIN STAGE 1: 3
BH CV STRESS DURATION MIN STAGE 2: 3
BH CV STRESS DURATION MIN STAGE 3: 1
BH CV STRESS DURATION SEC STAGE 1: 0
BH CV STRESS DURATION SEC STAGE 2: 0
BH CV STRESS DURATION SEC STAGE 3: 48
BH CV STRESS GRADE STAGE 1: 10
BH CV STRESS GRADE STAGE 2: 12
BH CV STRESS GRADE STAGE 3: 14
BH CV STRESS HR STAGE 1: 95
BH CV STRESS HR STAGE 2: 108
BH CV STRESS HR STAGE 3: 133
BH CV STRESS METS STAGE 1: 5
BH CV STRESS METS STAGE 2: 7.5
BH CV STRESS METS STAGE 3: 10
BH CV STRESS NUCLEAR ISOTOPE DOSE: 29.8 MCI
BH CV STRESS PROTOCOL 1: NORMAL
BH CV STRESS RECOVERY BP: NORMAL MMHG
BH CV STRESS RECOVERY HR: 67 BPM
BH CV STRESS SPEED STAGE 1: 1.7
BH CV STRESS SPEED STAGE 2: 2.5
BH CV STRESS SPEED STAGE 3: 3.4
BH CV STRESS STAGE 1: 1
BH CV STRESS STAGE 2: 2
BH CV STRESS STAGE 3: 3
LV EF NUC BP: 64 %
MAXIMAL PREDICTED HEART RATE: 165 BPM
PERCENT MAX PREDICTED HR: 80.61 %
STRESS BASELINE BP: NORMAL MMHG
STRESS BASELINE HR: 61 BPM
STRESS PERCENT HR: 95 %
STRESS POST ESTIMATED WORKLOAD: 10.1 METS
STRESS POST EXERCISE DUR MIN: 7 MIN
STRESS POST EXERCISE DUR SEC: 48 SEC
STRESS POST PEAK BP: NORMAL MMHG
STRESS POST PEAK HR: 133 BPM
STRESS TARGET HR: 140 BPM

## 2022-04-20 RX ORDER — ROSUVASTATIN CALCIUM 40 MG/1
40 TABLET, COATED ORAL DAILY
Qty: 30 TABLET | Refills: 3 | Status: SHIPPED | OUTPATIENT
Start: 2022-04-20 | End: 2022-12-14

## 2022-04-23 LAB
BH CV ECHO MEAS - ACS: 2 CM
BH CV ECHO MEAS - AO MAX PG: 8 MMHG
BH CV ECHO MEAS - AO MEAN PG: 4 MMHG
BH CV ECHO MEAS - AO ROOT DIAM: 3.1 CM
BH CV ECHO MEAS - AO V2 MAX: 141 CM/SEC
BH CV ECHO MEAS - AO V2 VTI: 29.5 CM
BH CV ECHO MEAS - EDV(CUBED): 88.1 ML
BH CV ECHO MEAS - EDV(MOD-SP4): 80.2 ML
BH CV ECHO MEAS - EF(MOD-SP4): 64.5 %
BH CV ECHO MEAS - ESV(CUBED): 25.9 ML
BH CV ECHO MEAS - ESV(MOD-SP4): 28.5 ML
BH CV ECHO MEAS - FS: 33.5 %
BH CV ECHO MEAS - IVS/LVPW: 0.94 CM
BH CV ECHO MEAS - IVSD: 0.96 CM
BH CV ECHO MEAS - LA DIMENSION: 3.4 CM
BH CV ECHO MEAS - LAT PEAK E' VEL: 13.8 CM/SEC
BH CV ECHO MEAS - LV DIASTOLIC VOL/BSA (35-75): 44.6 CM2
BH CV ECHO MEAS - LV MASS(C)D: 148.3 GRAMS
BH CV ECHO MEAS - LV SYSTOLIC VOL/BSA (12-30): 15.9 CM2
BH CV ECHO MEAS - LVIDD: 4.5 CM
BH CV ECHO MEAS - LVIDS: 3 CM
BH CV ECHO MEAS - LVOT AREA: 3.8 CM2
BH CV ECHO MEAS - LVOT DIAM: 2.2 CM
BH CV ECHO MEAS - LVPWD: 1.02 CM
BH CV ECHO MEAS - MED PEAK E' VEL: 10.4 CM/SEC
BH CV ECHO MEAS - MV A MAX VEL: 45 CM/SEC
BH CV ECHO MEAS - MV E MAX VEL: 90.4 CM/SEC
BH CV ECHO MEAS - MV E/A: 2.01
BH CV ECHO MEAS - PA ACC TIME: 0.07 SEC
BH CV ECHO MEAS - PA PR(ACCEL): 48.8 MMHG
BH CV ECHO MEAS - RAP SYSTOLE: 10 MMHG
BH CV ECHO MEAS - RVSP: 36.6 MMHG
BH CV ECHO MEAS - SI(MOD-SP4): 28.8 ML/M2
BH CV ECHO MEAS - SV(MOD-SP4): 51.7 ML
BH CV ECHO MEAS - TAPSE (>1.6): 2.26 CM
BH CV ECHO MEAS - TR MAX PG: 26.6 MMHG
BH CV ECHO MEAS - TR MAX VEL: 258 CM/SEC
BH CV ECHO MEASUREMENTS AVERAGE E/E' RATIO: 7.47
LEFT ATRIUM VOLUME INDEX: 22.9 ML/M2
MAXIMAL PREDICTED HEART RATE: 165 BPM
STRESS TARGET HR: 140 BPM

## 2022-06-29 ENCOUNTER — OFFICE VISIT (OUTPATIENT)
Dept: CARDIOLOGY | Facility: CLINIC | Age: 57
End: 2022-06-29

## 2022-06-29 VITALS
HEIGHT: 62 IN | OXYGEN SATURATION: 97 % | DIASTOLIC BLOOD PRESSURE: 57 MMHG | SYSTOLIC BLOOD PRESSURE: 95 MMHG | HEART RATE: 65 BPM | WEIGHT: 171 LBS | BODY MASS INDEX: 31.47 KG/M2

## 2022-06-29 DIAGNOSIS — R07.2 PRECORDIAL PAIN: Primary | ICD-10-CM

## 2022-06-29 PROCEDURE — 99213 OFFICE O/P EST LOW 20 MIN: CPT | Performed by: PHYSICIAN ASSISTANT

## 2022-06-29 RX ORDER — EPINEPHRINE 0.3 MG/.3ML
INJECTION SUBCUTANEOUS
COMMUNITY

## 2022-06-29 NOTE — PROGRESS NOTES
Wandy Fragoso DO  Asif Morin  1965  06/29/2022    Patient Active Problem List   Diagnosis   • Essential hypertension   • Dyslipidemia   • Precordial pain   • Abnormal nuclear stress test   • Angina, class III (HCC)   • Sebaceous cyst   • Skin cancer       Dear Wandy Fragoso, DO:    Subjective     History of Present Illness:    Chief Complaint   Patient presents with   • Med Management     Called pharmacy.    • Results     Stress echo and labs.    • Chest Pain   • Dizziness   • Shortness of Breath       Asif Morin is a pleasant 56 y.o. male with a past medical history significant for chronic chest pains with normal LHC on 1/13/2020. He does have history of GERD, essential hypertension, and dyslipidemia. He comes in for routine cardiology follow up.     Asif did have stress test performed which did show small area of mild severity of ischemia in the inferior wall.  He does still report regular chest pain that he describes as sharp in the center of his chest that will last for a few seconds to a couple minutes.  He did have left heart catheterization in 2020 which showed normal coronary arteries.  He denies shortness of breath, palpitations, or syncope.    Allergies   Allergen Reactions   • Bee Venom Swelling and Rash   :      Current Outpatient Medications:   •  ALBUTEROL IN, Inhale As Needed., Disp: , Rfl:   •  EPINEPHrine (EpiPen 2-Neal) 0.3 MG/0.3ML solution auto-injector injection, , Disp: , Rfl:   •  ibuprofen (ADVIL,MOTRIN) 800 MG tablet, Take 1 tablet by mouth every 6 (six) hours as needed for mild pain (1-3)., Disp: 30 tablet, Rfl: 0  •  lisinopril (PRINIVIL,ZESTRIL) 30 MG tablet, Take 30 mg by mouth Daily., Disp: , Rfl:   •  metoprolol tartrate (LOPRESSOR) 25 MG tablet, Take 25 mg by mouth 2 (Two) Times a Day., Disp: , Rfl:   •  nitroglycerin (NITROSTAT) 0.4 MG SL tablet, Place 1 tablet under the tongue Every 5 (Five) Minutes As Needed for Chest Pain., Disp: 25 tablet, Rfl:  "0  •  omeprazole (priLOSEC) 40 MG capsule, Take 40 mg by mouth Daily., Disp: , Rfl:   •  rosuvastatin (CRESTOR) 40 MG tablet, Take 1 tablet by mouth Daily., Disp: 30 tablet, Rfl: 3    The following portions of the patient's history were reviewed and updated as appropriate: allergies, current medications, past family history, past medical history, past social history, past surgical history and problem list.    Social History     Tobacco Use   • Smoking status: Former Smoker     Packs/day: 1.00   • Smokeless tobacco: Current User     Types: Snuff   Vaping Use   • Vaping Use: Never used   Substance Use Topics   • Alcohol use: No   • Drug use: No         Objective   Vitals:    06/29/22 1455   BP: 95/57   BP Location: Left arm   Patient Position: Sitting   Cuff Size: Adult   Pulse: 65   SpO2: 97%   Weight: 77.6 kg (171 lb)   Height: 157.5 cm (62\")     Body mass index is 31.28 kg/m².    Constitutional:       General: Not in acute distress.     Appearance: Healthy appearance. Well-developed and not in distress. Not diaphoretic.   Eyes:      Conjunctiva/sclera: Conjunctivae normal.      Pupils: Pupils are equal, round, and reactive to light.   HENT:      Head: Normocephalic and atraumatic.   Neck:      Vascular: No carotid bruit or JVD.   Pulmonary:      Effort: Pulmonary effort is normal. No respiratory distress.      Breath sounds: Normal breath sounds.   Cardiovascular:      Normal rate. Regular rhythm.   Skin:     General: Skin is cool.   Neurological:      Mental Status: Alert, oriented to person, place, and time and oriented to person, place and time.         Lab Results   Component Value Date     04/19/2022    K 4.1 04/19/2022     04/19/2022    CO2 21.0 (L) 04/19/2022    BUN 12 04/19/2022    CREATININE 1.21 04/19/2022    GLUCOSE 90 04/19/2022    CALCIUM 9.2 04/19/2022    AST 23 04/19/2022    ALT 20 04/19/2022    ALKPHOS 85 04/19/2022    LABIL2 1.4 (L) 05/16/2015     No results found for: CKTOTAL  Lab " Results   Component Value Date    WBC 5.36 04/19/2022    HGB 14.4 04/19/2022    HCT 42.6 04/19/2022     04/19/2022     Lab Results   Component Value Date    INR 0.93 01/10/2020    INR 0.92 01/26/2017     No results found for: MG  Lab Results   Component Value Date    TSH 2.913 08/03/2018    PSA 0.430 08/03/2018    CHLPL 207 (H) 05/16/2015    TRIG 156 (H) 04/19/2022    HDL 40 04/19/2022     (H) 04/19/2022      No results found for: BNP    During this visit the following were done:  Labs Reviewed []    Labs Ordered []    Radiology Reports Reviewed []    Radiology Ordered []    PCP Records Reviewed []    Referring Provider Records Reviewed []    ER Records Reviewed []    Hospital Records Reviewed []    History Obtained From Family []    Radiology Images Reviewed []    Other Reviewed []    Records Requested []       Procedures    Assessment & Plan    Diagnosis Plan   1. Precordial pain              Recommendations:  1. Precordial pain  1. I discussed results of echocardiogram and stress test.  Although stress test was abnormal I still have low suspicion for obstructive coronary artery disease.  He is still currently on lisinopril, Crestor, and metoprolol I asked him to start aspirin 81 mg daily.  I did encourage her to pursue noncardiac work-up such as EGD for further evaluation and given left heart catheterization in 2020 showing normal coronary arteries.      No follow-ups on file.    As always, I appreciate very much the opportunity to participate in the cardiovascular care of your patients.      With Best Regards,    Milton Ruelas PA-C

## 2022-09-21 ENCOUNTER — OFFICE VISIT (OUTPATIENT)
Dept: CARDIOLOGY | Facility: CLINIC | Age: 57
End: 2022-09-21

## 2022-09-21 VITALS
HEIGHT: 62 IN | BODY MASS INDEX: 31.1 KG/M2 | SYSTOLIC BLOOD PRESSURE: 128 MMHG | WEIGHT: 169 LBS | DIASTOLIC BLOOD PRESSURE: 77 MMHG | HEART RATE: 55 BPM

## 2022-09-21 DIAGNOSIS — R07.2 PRECORDIAL PAIN: ICD-10-CM

## 2022-09-21 DIAGNOSIS — E78.5 DYSLIPIDEMIA: ICD-10-CM

## 2022-09-21 DIAGNOSIS — I10 ESSENTIAL HYPERTENSION: Primary | ICD-10-CM

## 2022-09-21 PROCEDURE — 99213 OFFICE O/P EST LOW 20 MIN: CPT | Performed by: PHYSICIAN ASSISTANT

## 2022-09-21 PROCEDURE — 93000 ELECTROCARDIOGRAM COMPLETE: CPT | Performed by: PHYSICIAN ASSISTANT

## 2022-09-21 NOTE — PROGRESS NOTES
Wandy Fragoso DO  Asif Morin  1965  09/21/2022    Patient Active Problem List   Diagnosis   • Essential hypertension   • Dyslipidemia   • Precordial pain   • Abnormal nuclear stress test   • Angina, class III (HCC)   • Sebaceous cyst   • Skin cancer       Dear Wandy Fragoso DO:    Subjective     History of Present Illness:    Chief Complaint   Patient presents with   • Follow-up       Asif Morin is a pleasant 56 y.o. male with a past medical history significant for chronic chest pains with normal LHC on 1/13/2020. He does have history of GERD, essential hypertension, and dyslipidemia. He comes in for routine cardiology follow up.     Patient denies any chest pain, shortness of breath, palpitations, dizziness, syncope or near syncope.     Allergies   Allergen Reactions   • Bee Venom Swelling and Rash   :      Current Outpatient Medications:   •  ALBUTEROL IN, Inhale As Needed., Disp: , Rfl:   •  EPINEPHrine (EPIPEN) 0.3 MG/0.3ML solution auto-injector injection, , Disp: , Rfl:   •  ibuprofen (ADVIL,MOTRIN) 800 MG tablet, Take 1 tablet by mouth every 6 (six) hours as needed for mild pain (1-3)., Disp: 30 tablet, Rfl: 0  •  lisinopril (PRINIVIL,ZESTRIL) 30 MG tablet, Take 30 mg by mouth Daily., Disp: , Rfl:   •  metoprolol tartrate (LOPRESSOR) 25 MG tablet, Take 25 mg by mouth 2 (Two) Times a Day., Disp: , Rfl:   •  nitroglycerin (NITROSTAT) 0.4 MG SL tablet, Place 1 tablet under the tongue Every 5 (Five) Minutes As Needed for Chest Pain., Disp: 25 tablet, Rfl: 0  •  omeprazole (priLOSEC) 40 MG capsule, Take 40 mg by mouth Daily., Disp: , Rfl:   •  rosuvastatin (CRESTOR) 40 MG tablet, Take 1 tablet by mouth Daily., Disp: 30 tablet, Rfl: 3    The following portions of the patient's history were reviewed and updated as appropriate: allergies, current medications, past family history, past medical history, past social history, past surgical history and problem list.    Social History  "    Tobacco Use   • Smoking status: Former Smoker     Packs/day: 1.00   • Smokeless tobacco: Current User     Types: Snuff   Vaping Use   • Vaping Use: Never used   Substance Use Topics   • Alcohol use: No   • Drug use: No         Objective   Vitals:    09/21/22 0956   BP: 128/77   Pulse: 55   Weight: 76.7 kg (169 lb)   Height: 157.5 cm (62.01\")     Body mass index is 30.9 kg/m².    Constitutional:       General: Not in acute distress.     Appearance: Healthy appearance. Well-developed and not in distress. Not diaphoretic.   Eyes:      Conjunctiva/sclera: Conjunctivae normal.      Pupils: Pupils are equal, round, and reactive to light.   HENT:      Head: Normocephalic and atraumatic.   Neck:      Vascular: No carotid bruit or JVD.   Pulmonary:      Effort: Pulmonary effort is normal. No respiratory distress.      Breath sounds: Normal breath sounds.   Cardiovascular:      Normal rate. Regular rhythm.   Skin:     General: Skin is cool.   Neurological:      Mental Status: Alert, oriented to person, place, and time and oriented to person, place and time.         Lab Results   Component Value Date     04/19/2022    K 4.1 04/19/2022     04/19/2022    CO2 21.0 (L) 04/19/2022    BUN 12 04/19/2022    CREATININE 1.21 04/19/2022    GLUCOSE 90 04/19/2022    CALCIUM 9.2 04/19/2022    AST 23 04/19/2022    ALT 20 04/19/2022    ALKPHOS 85 04/19/2022    LABIL2 1.4 (L) 05/16/2015     No results found for: CKTOTAL  Lab Results   Component Value Date    WBC 5.36 04/19/2022    HGB 14.4 04/19/2022    HCT 42.6 04/19/2022     04/19/2022     Lab Results   Component Value Date    INR 0.93 01/10/2020    INR 0.92 01/26/2017     No results found for: MG  Lab Results   Component Value Date    TSH 2.913 08/03/2018    PSA 0.430 08/03/2018    CHLPL 207 (H) 05/16/2015    TRIG 156 (H) 04/19/2022    HDL 40 04/19/2022     (H) 04/19/2022      No results found for: BNP    During this visit the following were done:  Labs " Reviewed []    Labs Ordered []    Radiology Reports Reviewed []    Radiology Ordered []    PCP Records Reviewed []    Referring Provider Records Reviewed []    ER Records Reviewed []    Hospital Records Reviewed []    History Obtained From Family []    Radiology Images Reviewed []    Other Reviewed []    Records Requested []         ECG 12 Lead    Date/Time: 9/21/2022 9:57 AM  Performed by: Milton Ruelas PA-C  Authorized by: Milton Ruelas PA-C   Comparison: compared with previous ECG   Similar to previous ECG  Rhythm: sinus rhythm  Conduction: conduction normal  ST Segments: ST segments normal  Other findings: early repolarization    Clinical impression: normal ECG            Assessment & Plan    Diagnosis Plan   1. Essential hypertension     2. Dyslipidemia     3. Precordial pain              Recommendations:  1. Precordial pain  1. Now resolved Asif does deny any chest pains today.  We will continue with GDMT with Crestor, lisinopril, metoprolol.  2. Essential hypertension  1. Currently well controlled continue regimen mentioned above.      No follow-ups on file.    As always, I appreciate very much the opportunity to participate in the cardiovascular care of your patients.      With Best Regards,    Milton Ruelas PA-C

## 2022-09-28 RX ORDER — NITROGLYCERIN 0.4 MG/1
TABLET SUBLINGUAL
Qty: 25 TABLET | Refills: 0 | Status: SHIPPED | OUTPATIENT
Start: 2022-09-28 | End: 2022-11-14

## 2022-10-05 ENCOUNTER — LAB (OUTPATIENT)
Dept: LAB | Facility: HOSPITAL | Age: 57
End: 2022-10-05

## 2022-10-05 ENCOUNTER — TRANSCRIBE ORDERS (OUTPATIENT)
Dept: ADMINISTRATIVE | Facility: HOSPITAL | Age: 57
End: 2022-10-05

## 2022-10-05 ENCOUNTER — HOSPITAL ENCOUNTER (OUTPATIENT)
Dept: GENERAL RADIOLOGY | Facility: HOSPITAL | Age: 57
Discharge: HOME | End: 2022-10-05

## 2022-10-05 DIAGNOSIS — R60.0 EDEMA LEG: ICD-10-CM

## 2022-10-05 DIAGNOSIS — I73.9 PERIPHERAL VASCULAR DISEASE, UNSPECIFIED: ICD-10-CM

## 2022-10-05 DIAGNOSIS — M79.605 LEFT LEG PAIN: ICD-10-CM

## 2022-10-05 DIAGNOSIS — M79.605 LEFT LEG PAIN: Primary | ICD-10-CM

## 2022-10-05 LAB — D DIMER PPP FEU-MCNC: 3.2 MCGFEU/ML (ref 0–0.5)

## 2022-10-05 PROCEDURE — 73502 X-RAY EXAM HIP UNI 2-3 VIEWS: CPT | Performed by: RADIOLOGY

## 2022-10-05 PROCEDURE — 36415 COLL VENOUS BLD VENIPUNCTURE: CPT

## 2022-10-05 PROCEDURE — 80053 COMPREHEN METABOLIC PANEL: CPT

## 2022-10-05 PROCEDURE — 86140 C-REACTIVE PROTEIN: CPT

## 2022-10-05 PROCEDURE — 73502 X-RAY EXAM HIP UNI 2-3 VIEWS: CPT

## 2022-10-05 PROCEDURE — 85025 COMPLETE CBC W/AUTO DIFF WBC: CPT

## 2022-10-05 PROCEDURE — 85379 FIBRIN DEGRADATION QUANT: CPT

## 2022-10-06 LAB
ALBUMIN SERPL-MCNC: 4.6 G/DL (ref 3.5–5.2)
ALBUMIN/GLOB SERPL: 1.4 G/DL
ALP SERPL-CCNC: 90 U/L (ref 39–117)
ALT SERPL W P-5'-P-CCNC: 17 U/L (ref 1–41)
ANION GAP SERPL CALCULATED.3IONS-SCNC: 11 MMOL/L (ref 5–15)
AST SERPL-CCNC: 29 U/L (ref 1–40)
BASOPHILS # BLD AUTO: 0.03 10*3/MM3 (ref 0–0.2)
BASOPHILS NFR BLD AUTO: 0.5 % (ref 0–1.5)
BILIRUB SERPL-MCNC: 0.4 MG/DL (ref 0–1.2)
BUN SERPL-MCNC: 21 MG/DL (ref 6–20)
BUN/CREAT SERPL: 14.8 (ref 7–25)
CALCIUM SPEC-SCNC: 9.8 MG/DL (ref 8.6–10.5)
CHLORIDE SERPL-SCNC: 104 MMOL/L (ref 98–107)
CO2 SERPL-SCNC: 23 MMOL/L (ref 22–29)
CREAT SERPL-MCNC: 1.42 MG/DL (ref 0.76–1.27)
CRP SERPL-MCNC: <0.3 MG/DL (ref 0–0.5)
DEPRECATED RDW RBC AUTO: 44.3 FL (ref 37–54)
EGFRCR SERPLBLD CKD-EPI 2021: 58.4 ML/MIN/1.73
EOSINOPHIL # BLD AUTO: 0.19 10*3/MM3 (ref 0–0.4)
EOSINOPHIL NFR BLD AUTO: 3.2 % (ref 0.3–6.2)
ERYTHROCYTE [DISTWIDTH] IN BLOOD BY AUTOMATED COUNT: 12.9 % (ref 12.3–15.4)
GLOBULIN UR ELPH-MCNC: 3.3 GM/DL
GLUCOSE SERPL-MCNC: 76 MG/DL (ref 65–99)
HCT VFR BLD AUTO: 45 % (ref 37.5–51)
HGB BLD-MCNC: 15.5 G/DL (ref 13–17.7)
IMM GRANULOCYTES # BLD AUTO: 0.02 10*3/MM3 (ref 0–0.05)
IMM GRANULOCYTES NFR BLD AUTO: 0.3 % (ref 0–0.5)
LYMPHOCYTES # BLD AUTO: 1.8 10*3/MM3 (ref 0.7–3.1)
LYMPHOCYTES NFR BLD AUTO: 30.6 % (ref 19.6–45.3)
MCH RBC QN AUTO: 32 PG (ref 26.6–33)
MCHC RBC AUTO-ENTMCNC: 34.4 G/DL (ref 31.5–35.7)
MCV RBC AUTO: 93 FL (ref 79–97)
MONOCYTES # BLD AUTO: 0.51 10*3/MM3 (ref 0.1–0.9)
MONOCYTES NFR BLD AUTO: 8.7 % (ref 5–12)
NEUTROPHILS NFR BLD AUTO: 3.33 10*3/MM3 (ref 1.7–7)
NEUTROPHILS NFR BLD AUTO: 56.7 % (ref 42.7–76)
NRBC BLD AUTO-RTO: 0 /100 WBC (ref 0–0.2)
PLATELET # BLD AUTO: 249 10*3/MM3 (ref 140–450)
PMV BLD AUTO: 10.2 FL (ref 6–12)
POTASSIUM SERPL-SCNC: 4.2 MMOL/L (ref 3.5–5.2)
PROT SERPL-MCNC: 7.9 G/DL (ref 6–8.5)
RBC # BLD AUTO: 4.84 10*6/MM3 (ref 4.14–5.8)
SODIUM SERPL-SCNC: 138 MMOL/L (ref 136–145)
WBC NRBC COR # BLD: 5.88 10*3/MM3 (ref 3.4–10.8)

## 2022-10-07 ENCOUNTER — APPOINTMENT (OUTPATIENT)
Dept: ULTRASOUND IMAGING | Facility: HOSPITAL | Age: 57
End: 2022-10-07

## 2022-10-07 ENCOUNTER — HOSPITAL ENCOUNTER (EMERGENCY)
Facility: HOSPITAL | Age: 57
Discharge: HOME | End: 2022-10-08
Attending: NURSE PRACTITIONER | Admitting: EMERGENCY MEDICINE

## 2022-10-07 DIAGNOSIS — M79.89 LEFT LEG SWELLING: ICD-10-CM

## 2022-10-07 DIAGNOSIS — M79.605 LEG PAIN, POSTERIOR, LEFT: Primary | ICD-10-CM

## 2022-10-07 LAB
ALBUMIN SERPL-MCNC: 4.63 G/DL (ref 3.5–5.2)
ALBUMIN/GLOB SERPL: 1.2 G/DL
ALP SERPL-CCNC: 90 U/L (ref 39–117)
ALT SERPL W P-5'-P-CCNC: 25 U/L (ref 1–41)
ANION GAP SERPL CALCULATED.3IONS-SCNC: 13.4 MMOL/L (ref 5–15)
AST SERPL-CCNC: 24 U/L (ref 1–40)
BASOPHILS # BLD AUTO: 0.04 10*3/MM3 (ref 0–0.2)
BASOPHILS NFR BLD AUTO: 0.6 % (ref 0–1.5)
BILIRUB SERPL-MCNC: 0.5 MG/DL (ref 0–1.2)
BUN SERPL-MCNC: 13 MG/DL (ref 6–20)
BUN/CREAT SERPL: 9.2 (ref 7–25)
CALCIUM SPEC-SCNC: 9.9 MG/DL (ref 8.6–10.5)
CHLORIDE SERPL-SCNC: 102 MMOL/L (ref 98–107)
CO2 SERPL-SCNC: 24.6 MMOL/L (ref 22–29)
CREAT SERPL-MCNC: 1.42 MG/DL (ref 0.76–1.27)
D DIMER PPP FEU-MCNC: 2.07 MCGFEU/ML (ref 0–0.5)
DEPRECATED RDW RBC AUTO: 45.5 FL (ref 37–54)
EGFRCR SERPLBLD CKD-EPI 2021: 58.4 ML/MIN/1.73
EOSINOPHIL # BLD AUTO: 0.17 10*3/MM3 (ref 0–0.4)
EOSINOPHIL NFR BLD AUTO: 2.4 % (ref 0.3–6.2)
ERYTHROCYTE [DISTWIDTH] IN BLOOD BY AUTOMATED COUNT: 12.7 % (ref 12.3–15.4)
GLOBULIN UR ELPH-MCNC: 3.9 GM/DL
GLUCOSE SERPL-MCNC: 90 MG/DL (ref 65–99)
HCT VFR BLD AUTO: 48 % (ref 37.5–51)
HGB BLD-MCNC: 15.7 G/DL (ref 13–17.7)
HOLD SPECIMEN: NORMAL
HOLD SPECIMEN: NORMAL
IMM GRANULOCYTES # BLD AUTO: 0.02 10*3/MM3 (ref 0–0.05)
IMM GRANULOCYTES NFR BLD AUTO: 0.3 % (ref 0–0.5)
INR PPP: 0.95 (ref 0.9–1.1)
LYMPHOCYTES # BLD AUTO: 2.21 10*3/MM3 (ref 0.7–3.1)
LYMPHOCYTES NFR BLD AUTO: 31.3 % (ref 19.6–45.3)
MCH RBC QN AUTO: 31.7 PG (ref 26.6–33)
MCHC RBC AUTO-ENTMCNC: 32.7 G/DL (ref 31.5–35.7)
MCV RBC AUTO: 96.8 FL (ref 79–97)
MONOCYTES # BLD AUTO: 0.59 10*3/MM3 (ref 0.1–0.9)
MONOCYTES NFR BLD AUTO: 8.4 % (ref 5–12)
NEUTROPHILS NFR BLD AUTO: 4.03 10*3/MM3 (ref 1.7–7)
NEUTROPHILS NFR BLD AUTO: 57 % (ref 42.7–76)
NRBC BLD AUTO-RTO: 0 /100 WBC (ref 0–0.2)
PLATELET # BLD AUTO: 234 10*3/MM3 (ref 140–450)
PMV BLD AUTO: 9.1 FL (ref 6–12)
POTASSIUM SERPL-SCNC: 4.2 MMOL/L (ref 3.5–5.2)
PROT SERPL-MCNC: 8.5 G/DL (ref 6–8.5)
PROTHROMBIN TIME: 12.8 SECONDS (ref 12.1–14.7)
RBC # BLD AUTO: 4.96 10*6/MM3 (ref 4.14–5.8)
SODIUM SERPL-SCNC: 140 MMOL/L (ref 136–145)
WBC NRBC COR # BLD: 7.06 10*3/MM3 (ref 3.4–10.8)
WHOLE BLOOD HOLD COAG: NORMAL
WHOLE BLOOD HOLD SPECIMEN: NORMAL

## 2022-10-07 PROCEDURE — 85379 FIBRIN DEGRADATION QUANT: CPT | Performed by: NURSE PRACTITIONER

## 2022-10-07 PROCEDURE — 80053 COMPREHEN METABOLIC PANEL: CPT | Performed by: NURSE PRACTITIONER

## 2022-10-07 PROCEDURE — 99283 EMERGENCY DEPT VISIT LOW MDM: CPT

## 2022-10-07 PROCEDURE — 93971 EXTREMITY STUDY: CPT

## 2022-10-07 PROCEDURE — 85025 COMPLETE CBC W/AUTO DIFF WBC: CPT | Performed by: NURSE PRACTITIONER

## 2022-10-07 PROCEDURE — 85610 PROTHROMBIN TIME: CPT | Performed by: NURSE PRACTITIONER

## 2022-10-07 PROCEDURE — 36415 COLL VENOUS BLD VENIPUNCTURE: CPT

## 2022-10-07 RX ORDER — SODIUM CHLORIDE 0.9 % (FLUSH) 0.9 %
10 SYRINGE (ML) INJECTION AS NEEDED
Status: DISCONTINUED | OUTPATIENT
Start: 2022-10-07 | End: 2022-10-07

## 2022-10-08 ENCOUNTER — APPOINTMENT (OUTPATIENT)
Dept: CT IMAGING | Facility: HOSPITAL | Age: 57
End: 2022-10-08

## 2022-10-08 VITALS
RESPIRATION RATE: 18 BRPM | TEMPERATURE: 98 F | BODY MASS INDEX: 31.1 KG/M2 | DIASTOLIC BLOOD PRESSURE: 76 MMHG | WEIGHT: 169 LBS | OXYGEN SATURATION: 98 % | HEART RATE: 72 BPM | HEIGHT: 62 IN | SYSTOLIC BLOOD PRESSURE: 126 MMHG

## 2022-10-08 PROBLEM — M79.89 LEFT LEG SWELLING: Status: ACTIVE | Noted: 2022-10-08

## 2022-10-08 PROBLEM — M79.605 ACUTE LEG PAIN, LEFT: Status: ACTIVE | Noted: 2022-10-08

## 2022-10-08 PROCEDURE — 71275 CT ANGIOGRAPHY CHEST: CPT

## 2022-10-08 PROCEDURE — 0 IOPAMIDOL PER 1 ML: Performed by: EMERGENCY MEDICINE

## 2022-10-08 RX ORDER — HYDROCODONE BITARTRATE AND ACETAMINOPHEN 5; 325 MG/1; MG/1
1 TABLET ORAL ONCE
Status: COMPLETED | OUTPATIENT
Start: 2022-10-08 | End: 2022-10-08

## 2022-10-08 RX ADMIN — IOPAMIDOL 70 ML: 755 INJECTION, SOLUTION INTRAVENOUS at 00:36

## 2022-10-08 RX ADMIN — HYDROCODONE BITARTRATE AND ACETAMINOPHEN 1 TABLET: 5; 325 TABLET ORAL at 01:43

## 2022-10-08 NOTE — ED PROVIDER NOTES
Subjective   History of Present Illness  The patient is a 56-year-old male that presents to the ER with his wife with complaints of left leg pain.  Patient states his primary care doctor said he had abnormal blood work and recommended that he come to the emergency room to be checked for a blood clot in his left leg.        Review of Systems   Constitutional: Negative.  Negative for fever.   HENT: Negative.    Respiratory: Negative.    Cardiovascular: Negative.  Negative for chest pain.   Gastrointestinal: Negative.  Negative for abdominal pain.   Endocrine: Negative.    Genitourinary: Negative.  Negative for dysuria.   Musculoskeletal: Positive for myalgias. Negative for gait problem and joint swelling.        Pain in the left calf muscle.    Skin: Negative.  Negative for color change, pallor, rash and wound.   Neurological: Negative.    Psychiatric/Behavioral: Negative.    All other systems reviewed and are negative.      Past Medical History:   Diagnosis Date   • Arthritis    • Cancer (HCC)     SKIN CANCER   • COPD (chronic obstructive pulmonary disease) (HCC)    • Heart disease    • Hyperlipidemia    • Hypertension    • Myocardial infarction (HCC)    • Stroke (HCC)        Allergies   Allergen Reactions   • Bee Venom Swelling and Rash       Past Surgical History:   Procedure Laterality Date   • CARDIAC CATHETERIZATION N/A 1/13/2020    Procedure: Left Heart Cath;  Surgeon: Siddhartha Butler MD;  Location: [facility] INVASIVE LOCATION;  Service: Cardiology   • SKIN CANCER EXCISION      left elbow       Family History   Problem Relation Age of Onset   • No Known Problems Mother    • No Known Problems Father        Social History     Socioeconomic History   • Marital status:    Tobacco Use   • Smoking status: Former     Packs/day: 1.00     Types: Cigarettes   • Smokeless tobacco: Current     Types: Snuff   Vaping Use   • Vaping Use: Never used   Substance and Sexual Activity   • Alcohol use: No   • Drug use: No    • Sexual activity: Defer           Objective   Physical Exam  Vitals and nursing note reviewed.   Constitutional:       General: He is not in acute distress.     Appearance: He is well-developed. He is not diaphoretic.   HENT:      Head: Normocephalic and atraumatic.      Right Ear: External ear normal.      Left Ear: External ear normal.      Nose: Nose normal.   Eyes:      Conjunctiva/sclera: Conjunctivae normal.      Pupils: Pupils are equal, round, and reactive to light.   Neck:      Vascular: No JVD.      Trachea: No tracheal deviation.   Cardiovascular:      Rate and Rhythm: Normal rate and regular rhythm.      Heart sounds: Normal heart sounds. No murmur heard.  Pulmonary:      Effort: Pulmonary effort is normal. No respiratory distress.      Breath sounds: Normal breath sounds. No wheezing.   Abdominal:      General: Bowel sounds are normal.      Palpations: Abdomen is soft.      Tenderness: There is no abdominal tenderness.   Musculoskeletal:         General: Tenderness present. No deformity. Normal range of motion.      Cervical back: Normal range of motion and neck supple.      Left lower leg: Edema present.   Skin:     General: Skin is warm and dry.      Coloration: Skin is not pale.      Findings: No bruising, erythema, lesion or rash.   Neurological:      Mental Status: He is alert and oriented to person, place, and time.      Cranial Nerves: No cranial nerve deficit.   Psychiatric:         Behavior: Behavior normal.         Thought Content: Thought content normal.         Procedures           ED Course  ED Course as of 10/10/22 1230   Fri Oct 07, 2022   8536  Veins LE Duplex LTD      HISTORY:   Left lower leg pain     TECHNIQUE:    Real-time ultrasound was performed of the left lower extremity utilizing spectral and color Doppler with compression and augmentation techniques.      COMPARISON:  None available.     FINDINGS:  No evidence of a left lower extremity deep vein thrombosis. The common  femoral vein through the popliteal vein are widely patent. The Greater Saphenous vein is patent.  There is normal compressibility with spontaneous and phasic waveforms. No calf vein  thrombus.      IMPRESSION:  No deep venous thrombus in the left lower extremity.      Signer Name: MARYA HERR MD   Signed: 10/7/2022 9:56 PM   Workstation Name: [email or web address]    [facility] [[place]]   Sat Oct 08, 2022   0059 CT CHEST PULMONARY EMBOLISM W CONTRAST     INDICATION:   Shortness of breath with elevated d-dimer     TECHNIQUE:   CT angiogram of the chest with 100 cc of Isovue-300 IV contrast. 3-D reconstructions were obtained and reviewed.   Radiation dose reduction techniques included automated exposure control or exposure modulation based on body size. Count of known CT and  cardiac nuc med studies performed in previous 12 months: 1.      COMPARISON:   None available.     FINDINGS:   Chest images at mediastinal window show good filling of the pulmonary arteries. There are no pulmonary artery filling defects to suggest emboli. The CT angiographic reconstructions also show no evidence of emboli. No acute changes in the aorta. No  adenopathy or effusions.     Chest images at lung window show mild interstitial infiltrate at the left lung base. This is nonspecific. I suspect that it represents chronic basilar fibrosis. The right lung is clear.     IMPRESSION:  Mild fibrosis at the left lung base. No evidence of pulmonary embolism.     Signer Name: Doroteo Alex MD   Signed: 10/8/2022 12:59 AM   Workstation Name: [id number]    [facility] [[place]]      ED Course User Index  [[place]] Amanda Tracey, APRN                                           [facility]    Final diagnoses:   Leg pain, posterior, left   Left leg swelling       ED Disposition  ED Disposition     ED Disposition   Discharge    Condition   Stable    Comment   --             Wandy Fragoso, DO  [address]  [facility]  [id number]  [phone]    In 2 days      [facility] Emergency Department  [address] [facility] [address] [phone]    If symptoms worsen         Medication List      No changes were made to your prescriptions during this visit.          Amanda Tracey, APRN  10/10/22 123

## 2022-11-14 RX ORDER — NITROGLYCERIN 0.4 MG/1
TABLET SUBLINGUAL
Qty: 25 TABLET | Refills: 0 | Status: SHIPPED | OUTPATIENT
Start: 2022-11-14

## 2022-12-14 RX ORDER — ROSUVASTATIN CALCIUM 40 MG/1
TABLET, COATED ORAL
Qty: 30 TABLET | Refills: 3 | Status: SHIPPED | OUTPATIENT
Start: 2022-12-14

## 2023-05-24 RX ORDER — ROSUVASTATIN CALCIUM 40 MG/1
TABLET, COATED ORAL
Qty: 30 TABLET | Refills: 0 | Status: SHIPPED | OUTPATIENT
Start: 2023-05-24

## 2023-08-17 ENCOUNTER — OFFICE VISIT (OUTPATIENT)
Dept: SURGERY | Facility: CLINIC | Age: 58
End: 2023-08-17
Payer: COMMERCIAL

## 2023-08-17 VITALS
WEIGHT: 171.4 LBS | SYSTOLIC BLOOD PRESSURE: 112 MMHG | HEIGHT: 62 IN | DIASTOLIC BLOOD PRESSURE: 60 MMHG | BODY MASS INDEX: 31.54 KG/M2 | HEART RATE: 52 BPM

## 2023-08-17 DIAGNOSIS — D48.5 NEOPLASM OF UNCERTAIN BEHAVIOR OF SKIN: Primary | ICD-10-CM

## 2023-08-17 PROCEDURE — 99212 OFFICE O/P EST SF 10 MIN: CPT | Performed by: SURGERY

## 2023-08-17 PROCEDURE — 1160F RVW MEDS BY RX/DR IN RCRD: CPT | Performed by: SURGERY

## 2023-08-17 PROCEDURE — 3078F DIAST BP <80 MM HG: CPT | Performed by: SURGERY

## 2023-08-17 PROCEDURE — 1159F MED LIST DOCD IN RCRD: CPT | Performed by: SURGERY

## 2023-08-17 PROCEDURE — 3074F SYST BP LT 130 MM HG: CPT | Performed by: SURGERY

## 2023-08-17 NOTE — PROGRESS NOTES
Subjective   Asif Morin is a 57 y.o. male here today to have place on face checked.    History of Present Illness  Mr. Morin was seen in the office today for a new skin lesion on his right lateral cheek.  He does have a history of prior squamous cell carcinomas.  Patient states that this area has been present for months and is getting larger.  Allergies   Allergen Reactions    Bee Venom Swelling and Rash       Current Outpatient Medications   Medication Sig Dispense Refill    ALBUTEROL IN Inhale As Needed.      EPINEPHrine (EPIPEN) 0.3 MG/0.3ML solution auto-injector injection       ibuprofen (ADVIL,MOTRIN) 800 MG tablet Take 1 tablet by mouth every 6 (six) hours as needed for mild pain (1-3). 30 tablet 0    lisinopril (PRINIVIL,ZESTRIL) 30 MG tablet Take 1 tablet by mouth Daily.      metoprolol tartrate (LOPRESSOR) 25 MG tablet Take 1 tablet by mouth 2 (Two) Times a Day.      nitroglycerin (NITROSTAT) 0.4 MG SL tablet PLACE 1 TABLET UNDER THE TONGUE EVERY 5 MINUTES UP TO 3 TABLETS IN 15 MINUTES FOR CHEST PAIN. IF NO RELIEF GO TO THE EMERGENCY ROOM OR CALL 911 25 tablet 0    omeprazole (priLOSEC) 40 MG capsule Take 1 capsule by mouth Daily.      rosuvastatin (CRESTOR) 40 MG tablet TAKE 1 TABLET BY MOUTH EVERY DAY FOR CHOLESTEROL 30 tablet 3     No current facility-administered medications for this visit.     Past Medical History:   Diagnosis Date    Arthritis     Cancer     SKIN CANCER    COPD (chronic obstructive pulmonary disease)     Heart disease     Hyperlipidemia     Hypertension     Myocardial infarction     Stroke      Past Surgical History:   Procedure Laterality Date    CARDIAC CATHETERIZATION N/A 1/13/2020    Procedure: Left Heart Cath;  Surgeon: Siddhartha Butler MD;  Location: [facility] CATH INVASIVE LOCATION;  Service: Cardiology    SKIN CANCER EXCISION      left elbow       Pertinent Review of Systems  Patient is not currently on any blood    Objective   /60 (BP Location: Left arm)    "Pulse 52   Ht 157.5 cm (62\")   Wt 77.7 kg (171 lb 6.4 oz)   BMI 31.35 kg/mý    Physical Exam  Skin: On exam of the right lateral face there is an ulcerated flaking lesion measuring 1.3 x 8 mm.  Procedures     Results/Data:    Assessment & Plan   Probable squamous cell carcinoma of the right face    Patient will return to the office for excision once authorization is obtained       Discussion/Summary:    Time spent:     BMI is >= 30 and <35. (Class 1 Obesity). The following options were offered after discussion;: exercise counseling/recommendations         No future appointments.    Please note that portions of this note were completed with a voice recognition program.  "

## 2023-09-21 ENCOUNTER — PROCEDURE VISIT (OUTPATIENT)
Dept: SURGERY | Facility: CLINIC | Age: 58
End: 2023-09-21
Payer: COMMERCIAL

## 2023-09-21 VITALS
BODY MASS INDEX: 31.28 KG/M2 | HEART RATE: 68 BPM | WEIGHT: 170 LBS | DIASTOLIC BLOOD PRESSURE: 64 MMHG | HEIGHT: 62 IN | SYSTOLIC BLOOD PRESSURE: 104 MMHG

## 2023-09-21 DIAGNOSIS — D48.5 NEOPLASM OF UNCERTAIN BEHAVIOR OF SKIN: Primary | ICD-10-CM

## 2023-09-21 DIAGNOSIS — D48.5 NEOPLASM OF UNCERTAIN BEHAVIOR OF SKIN: ICD-10-CM

## 2023-09-21 NOTE — PROGRESS NOTES
"Subjective   Asif Rufino Morin is a 57 y.o. male here today to have place on right side of face removed.    History of Present Illness  Mr. Morin was seen in the office today for excision of a skin lesion on the face.  He was evaluated for this on 8/17/2023.  Patient states that he does feel that it has grown in the past month.  Allergies   Allergen Reactions    Bee Venom Swelling and Rash       Current Outpatient Medications   Medication Sig Dispense Refill    ALBUTEROL IN Inhale As Needed.      EPINEPHrine (EPIPEN) 0.3 MG/0.3ML solution auto-injector injection       ibuprofen (ADVIL,MOTRIN) 800 MG tablet Take 1 tablet by mouth every 6 (six) hours as needed for mild pain (1-3). 30 tablet 0    lisinopril (PRINIVIL,ZESTRIL) 30 MG tablet Take 1 tablet by mouth Daily.      metoprolol tartrate (LOPRESSOR) 25 MG tablet Take 1 tablet by mouth 2 (Two) Times a Day.      nitroglycerin (NITROSTAT) 0.4 MG SL tablet PLACE 1 TABLET UNDER THE TONGUE EVERY 5 MINUTES UP TO 3 TABLETS IN 15 MINUTES FOR CHEST PAIN. IF NO RELIEF GO TO THE EMERGENCY ROOM OR CALL 911 25 tablet 0    omeprazole (priLOSEC) 40 MG capsule Take 1 capsule by mouth Daily.      rosuvastatin (CRESTOR) 40 MG tablet TAKE 1 TABLET BY MOUTH EVERY DAY FOR CHOLESTEROL 30 tablet 3     No current facility-administered medications for this visit.     Past Medical History:   Diagnosis Date    Arthritis     Cancer     SKIN CANCER    COPD (chronic obstructive pulmonary disease)     Heart disease     Hyperlipidemia     Hypertension     Myocardial infarction     Stroke      Past Surgical History:   Procedure Laterality Date    CARDIAC CATHETERIZATION N/A 1/13/2020    Procedure: Left Heart Cath;  Surgeon: Siddhartha Butler MD;  Location: [facility] CATH INVASIVE LOCATION;  Service: Cardiology    SKIN CANCER EXCISION      left elbow       Pertinent Review of Systems      Objective   /64 (BP Location: Left arm)   Pulse 68   Ht 157.5 cm (62\")   Wt 77.1 kg (170 lb)   BMI " 31.09 kg/m²    Physical Exam  Skin: On exam of the skin of the right temple area there is an ulcerated lesion measuring 1.7 x 0.9 cm.  Procedures   Procedure Note    Procedure: Excision skin lesion    Location: Right temple area    Anesthesia: 1% lidocaine with epinephrine    Description:  The risks and benefits of the procedure were discussed.  After prep with Betadine and infiltration with lidocaine and incision was made around the lesion with a 15 scalpel and carried down into the subcu.  Grossly margins were very close but because of the size of the lesion it was felt that if any additional skin were removed it would be difficult to close the incision.  Very small additional rim of tissue was excised laterally.  This was sent separately.  Marking stitch was placed at 12:00.  The incision was closed in a 2 layer closure of interrupted 3-0 Vicryl subdermal sutures followed by interrupted 4-0 nylon sutures.  Incision length was 2.2 cm.  Dressing was placed    Complications:  none    Follow-up: 10 days  Results/Data:      Assessment & Plan   Neoplasm of uncertain behavior skin of right temple area    Follow-up in 10 days for suture removal and path review.       Discussion/Summary:    Time spent:     BMI is >= 30 and <35. (Class 1 Obesity). The following options were offered after discussion;: exercise counseling/recommendations         Future Appointments   Date Time Provider Department Center   9/26/2023  1:30 PM Milton Ruelas PA-C [facility]       Please note that portions of this note were completed with a voice recognition program.

## 2023-09-26 LAB — REF LAB TEST METHOD: NORMAL

## 2023-09-27 ENCOUNTER — OFFICE VISIT (OUTPATIENT)
Dept: CARDIOLOGY | Facility: CLINIC | Age: 58
End: 2023-09-27
Payer: COMMERCIAL

## 2023-09-27 VITALS
BODY MASS INDEX: 31.62 KG/M2 | WEIGHT: 171.8 LBS | HEIGHT: 62 IN | SYSTOLIC BLOOD PRESSURE: 97 MMHG | HEART RATE: 84 BPM | OXYGEN SATURATION: 96 % | DIASTOLIC BLOOD PRESSURE: 64 MMHG

## 2023-09-27 DIAGNOSIS — I10 ESSENTIAL HYPERTENSION: Primary | ICD-10-CM

## 2023-09-27 DIAGNOSIS — E78.5 DYSLIPIDEMIA: ICD-10-CM

## 2023-09-27 PROCEDURE — 99213 OFFICE O/P EST LOW 20 MIN: CPT | Performed by: PHYSICIAN ASSISTANT

## 2023-09-27 PROCEDURE — 93000 ELECTROCARDIOGRAM COMPLETE: CPT | Performed by: PHYSICIAN ASSISTANT

## 2023-09-27 PROCEDURE — 3078F DIAST BP <80 MM HG: CPT | Performed by: PHYSICIAN ASSISTANT

## 2023-09-27 PROCEDURE — 3074F SYST BP LT 130 MM HG: CPT | Performed by: PHYSICIAN ASSISTANT

## 2023-09-27 NOTE — PROGRESS NOTES
Wandy Fragoso DO  Asif Morin  1965  09/27/2023    Patient Active Problem List   Diagnosis    Essential hypertension    Dyslipidemia    Precordial pain    Abnormal nuclear stress test    Angina, class III    Sebaceous cyst    Skin cancer    Leg pain, posterior, left    Left leg swelling    Neoplasm of uncertain behavior of skin       Dear Wandy Fragoso, DO:    Subjective     History of Present Illness:    Chief Complaint   Patient presents with    Follow-up     ROUTINE       Asif Morin is a pleasant 57 y.o. male with a past medical history significant for chronic chest pains with normal LHC on 1/13/2020. He does have history of GERD, essential hypertension, and dyslipidemia. He comes in for routine cardiology follow up.      Patient denies any chest pain, shortness of breath, palpitations, dizziness, syncope or near syncope. Blood pressure is borderline line but he reports he feels fine denying in light headedness, weakness, or fatigue.     Allergies   Allergen Reactions    Bee Venom Swelling and Rash   :      Current Outpatient Medications:     ALBUTEROL IN, Inhale As Needed., Disp: , Rfl:     EPINEPHrine (EPIPEN) 0.3 MG/0.3ML solution auto-injector injection, , Disp: , Rfl:     ibuprofen (ADVIL,MOTRIN) 800 MG tablet, Take 1 tablet by mouth every 6 (six) hours as needed for mild pain (1-3)., Disp: 30 tablet, Rfl: 0    lisinopril (PRINIVIL,ZESTRIL) 30 MG tablet, Take 1 tablet by mouth Daily., Disp: , Rfl:     metoprolol tartrate (LOPRESSOR) 25 MG tablet, Take 1 tablet by mouth 2 (Two) Times a Day., Disp: , Rfl:     nitroglycerin (NITROSTAT) 0.4 MG SL tablet, PLACE 1 TABLET UNDER THE TONGUE EVERY 5 MINUTES UP TO 3 TABLETS IN 15 MINUTES FOR CHEST PAIN. IF NO RELIEF GO TO THE EMERGENCY ROOM OR CALL 911, Disp: 25 tablet, Rfl: 0    omeprazole (priLOSEC) 40 MG capsule, Take 1 capsule by mouth Daily., Disp: , Rfl:     rosuvastatin (CRESTOR) 40 MG tablet, TAKE 1 TABLET BY MOUTH EVERY DAY FOR  "CHOLESTEROL, Disp: 30 tablet, Rfl: 3    The following portions of the patient's history were reviewed and updated as appropriate: allergies, current medications, past family history, past medical history, past social history, past surgical history and problem list.    Social History     Tobacco Use    Smoking status: Former     Packs/day: 1.00     Types: Cigarettes    Smokeless tobacco: Current     Types: Snuff   Vaping Use    Vaping Use: Never used   Substance Use Topics    Alcohol use: No    Drug use: No         Objective   Vitals:    09/27/23 1456   BP: 97/64   Pulse: 84   SpO2: 96%   Weight: 77.9 kg (171 lb 12.8 oz)   Height: 157.5 cm (62\")     Body mass index is 31.42 kg/m².    Constitutional:       General: Not in acute distress.     Appearance: Healthy appearance. Well-developed and not in distress. Not diaphoretic.   Eyes:      Conjunctiva/sclera: Conjunctivae normal.      Pupils: Pupils are equal, round, and reactive to light.   HENT:      Head: Normocephalic and atraumatic.   Neck:      Vascular: No carotid bruit or JVD.   Pulmonary:      Effort: Pulmonary effort is normal. No respiratory distress.      Breath sounds: Normal breath sounds.   Cardiovascular:      Normal rate. Regular rhythm.   Edema:     Peripheral edema absent.   Skin:     General: Skin is cool.   Neurological:      Mental Status: Alert, oriented to person, place, and time and oriented to person, place and time.       Lab Results   Component Value Date     10/07/2022    K 4.2 10/07/2022     10/07/2022    CO2 24.6 10/07/2022    BUN 13 10/07/2022    CREATININE 1.42 (H) 10/07/2022    GLUCOSE 90 10/07/2022    CALCIUM 9.9 10/07/2022    AST 24 10/07/2022    ALT 25 10/07/2022    ALKPHOS 90 10/07/2022    LABIL2 1.4 (L) 05/16/2015     No results found for: CKTOTAL  Lab Results   Component Value Date    WBC 7.06 10/07/2022    HGB 15.7 10/07/2022    HCT 48.0 10/07/2022     10/07/2022     Lab Results   Component Value Date    INR " 0.95 10/07/2022    INR 0.93 01/10/2020    INR 0.92 01/26/2017     No results found for: MG  Lab Results   Component Value Date    TSH 2.913 08/03/2018    PSA 0.430 08/03/2018    CHLPL 207 (H) 05/16/2015    TRIG 156 (H) 04/19/2022    HDL 40 04/19/2022     (H) 04/19/2022      No results found for: BNP    During this visit the following were done:  Labs Reviewed []    Labs Ordered []    Radiology Reports Reviewed []    Radiology Ordered []    PCP Records Reviewed []    Referring Provider Records Reviewed []    ER Records Reviewed []    Hospital Records Reviewed []    History Obtained From Family []    Radiology Images Reviewed []    Other Reviewed []    Records Requested []         ECG 12 Lead    Date/Time: 9/27/2023 2:58 PM  Performed by: Milton Ruelas PA-C  Authorized by: Milton Ruelas PA-C   Comparison: compared with previous ECG   Similar to previous ECG  Rhythm: sinus rhythm  Conduction: conduction normal    Clinical impression: normal ECG      Assessment & Plan    Diagnosis Plan   1. Essential hypertension        2. Dyslipidemia                 Recommendations:  Essential hypertension  Borderline low today but he is asymptomatic so will continue with current regimen. I did instruct him to contact our office if he develops symptoms and we can reduce his lisinopril to 20 mg if needed.   Dyslipidemia  Continue crestor. Check lipid panel    No follow-ups on file.    As always, I appreciate very much the opportunity to participate in the cardiovascular care of your patients.      With Best Regards,    Milton Ruelas PA-C

## 2023-10-02 ENCOUNTER — OFFICE VISIT (OUTPATIENT)
Dept: SURGERY | Facility: CLINIC | Age: 58
End: 2023-10-02
Payer: COMMERCIAL

## 2023-10-02 VITALS
BODY MASS INDEX: 32.09 KG/M2 | HEART RATE: 86 BPM | DIASTOLIC BLOOD PRESSURE: 64 MMHG | HEIGHT: 62 IN | SYSTOLIC BLOOD PRESSURE: 102 MMHG | WEIGHT: 174.4 LBS

## 2023-10-02 DIAGNOSIS — C44.92 SQUAMOUS CELL CARCINOMA OF SKIN: Primary | ICD-10-CM

## 2023-10-02 PROCEDURE — 1159F MED LIST DOCD IN RCRD: CPT | Performed by: SURGERY

## 2023-10-02 PROCEDURE — 99024 POSTOP FOLLOW-UP VISIT: CPT | Performed by: SURGERY

## 2023-10-02 PROCEDURE — 3074F SYST BP LT 130 MM HG: CPT | Performed by: SURGERY

## 2023-10-02 PROCEDURE — 3078F DIAST BP <80 MM HG: CPT | Performed by: SURGERY

## 2023-10-02 PROCEDURE — 1160F RVW MEDS BY RX/DR IN RCRD: CPT | Performed by: SURGERY

## 2023-10-02 NOTE — PROGRESS NOTES
Subjective   Asif Morin is a 57 y.o. male here today for suture removal and pathology review.    History of Present Illness  Mr. 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  Lateral margin was positive.  Patient states he can feel another place on the right side of his face.  Allergies   Allergen Reactions    Bee Venom Swelling and Rash       Current Outpatient Medications   Medication Sig Dispense Refill    ALBUTEROL IN Inhale As Needed.      EPINEPHrine (EPIPEN) 0.3 MG/0.3ML solution auto-injector injection       ibuprofen (ADVIL,MOTRIN) 800 MG tablet Take 1 tablet by mouth every 6 (six) hours as needed for mild pain (1-3). 30 tablet 0    lisinopril (PRINIVIL,ZESTRIL) 30 MG tablet Take 1 tablet by mouth Daily.      metoprolol tartrate (LOPRESSOR) 25 MG tablet Take 1 tablet by mouth 2 (Two) Times a Day.      nitroglycerin (NITROSTAT) 0.4 MG SL tablet PLACE 1 TABLET UNDER THE TONGUE EVERY 5 MINUTES UP TO 3 TABLETS IN 15 MINUTES FOR CHEST PAIN. IF NO RELIEF GO TO THE EMERGENCY ROOM OR CALL 911 25 tablet 0    omeprazole (priLOSEC) 40 MG capsule Take 1 capsule by mouth Daily.      rosuvastatin (CRESTOR) 40 MG tablet TAKE 1 TABLET BY MOUTH EVERY DAY FOR CHOLESTEROL 30 tablet 3     No current facility-administered medications for this visit.     Past Medical History:   Diagnosis Date    Arthritis     Cancer     SKIN CANCER    COPD (chronic obstructive pulmonary disease)     Heart disease     Hyperlipidemia     Hypertension     Myocardial infarction     Stroke      Past Surgical History:   Procedure Laterality Date    CARDIAC CATHETERIZATION N/A 1/13/2020    Procedure: Left Heart Cath;  Surgeon: Siddhartha Butler MD;  Location: [facility] CATH INVASIVE LOCATION;  Service: Cardiology    SKIN CANCER EXCISION      left elbow       Pertinent Review of  "Systems      Objective   /64 (BP Location: Left arm)   Pulse 86   Ht 157.5 cm (62\")   Wt 79.1 kg (174 lb 6.4 oz)   BMI 31.90 kg/m²    Physical Exam  Well-developed well-nourished male  On the right lateral aspect of the face there is a healing surgical scar and sutures were removed without complication.  No gross residual disease present at the lateral margin or other margins.  Just lateral to this there is also a slightly raised area suspicious for possible early skin cancer which measures 1.2 x 0.8 cm.  Patient noticed another skin lesion medial to the 1 just excised measuring 6.4 mm.  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    Patient has 2 additional skin lesions on the face for which he declined to have biopsy today.  He was therefore given a follow-up visit to return in 1 months to reexamine the other 2 lesions and possibly do a punch biopsy for diagnosis.       Discussion/Summary:    Time spent:     BMI is >= 30 and <35. (Class 1 Obesity). The following options were offered after discussion;: exercise counseling/recommendations         Future Appointments   Date Time Provider Department Center   3/27/2024  3:30 PM Milton Ruelas, [facility]       Please note that portions of this note were completed with a voice recognition program.  "

## 2023-10-30 ENCOUNTER — OFFICE VISIT (OUTPATIENT)
Dept: SURGERY | Facility: CLINIC | Age: 58
End: 2023-10-30
Payer: COMMERCIAL

## 2023-10-30 VITALS
DIASTOLIC BLOOD PRESSURE: 72 MMHG | SYSTOLIC BLOOD PRESSURE: 101 MMHG | HEIGHT: 62 IN | WEIGHT: 177 LBS | BODY MASS INDEX: 32.57 KG/M2

## 2023-10-30 DIAGNOSIS — C44.92 SQUAMOUS CELL CARCINOMA OF SKIN: Primary | ICD-10-CM

## 2023-10-30 DIAGNOSIS — Z51.89 VISIT FOR WOUND CHECK: ICD-10-CM

## 2023-10-30 DIAGNOSIS — D48.5 NEOPLASM OF UNCERTAIN BEHAVIOR OF SKIN: ICD-10-CM

## 2023-10-30 PROCEDURE — 1159F MED LIST DOCD IN RCRD: CPT | Performed by: SURGERY

## 2023-10-30 PROCEDURE — 3078F DIAST BP <80 MM HG: CPT | Performed by: SURGERY

## 2023-10-30 PROCEDURE — 1160F RVW MEDS BY RX/DR IN RCRD: CPT | Performed by: SURGERY

## 2023-10-30 PROCEDURE — 99212 OFFICE O/P EST SF 10 MIN: CPT | Performed by: SURGERY

## 2023-10-30 PROCEDURE — 3074F SYST BP LT 130 MM HG: CPT | Performed by: SURGERY

## 2023-11-03 NOTE — PROGRESS NOTES
Subjective   Asif Morin is a 58 y.o. male is here today for follow-up.    History of Present Illness  Mr. 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  Lateral margin was positive.  At the time of his last visit on 10/2/2023 he thought that he felt a possible additional place on the right side of his face.  Allergies   Allergen Reactions    Bee Venom Swelling and Rash       Current Outpatient Medications   Medication Sig Dispense Refill    ALBUTEROL IN Inhale As Needed.      EPINEPHrine (EPIPEN) 0.3 MG/0.3ML solution auto-injector injection       ibuprofen (ADVIL,MOTRIN) 800 MG tablet Take 1 tablet by mouth every 6 (six) hours as needed for mild pain (1-3). 30 tablet 0    lisinopril (PRINIVIL,ZESTRIL) 30 MG tablet Take 1 tablet by mouth Daily.      metoprolol tartrate (LOPRESSOR) 25 MG tablet Take 1 tablet by mouth 2 (Two) Times a Day.      nitroglycerin (NITROSTAT) 0.4 MG SL tablet PLACE 1 TABLET UNDER THE TONGUE EVERY 5 MINUTES UP TO 3 TABLETS IN 15 MINUTES FOR CHEST PAIN. IF NO RELIEF GO TO THE EMERGENCY ROOM OR CALL 911 25 tablet 0    omeprazole (priLOSEC) 40 MG capsule Take 1 capsule by mouth Daily.      rosuvastatin (CRESTOR) 40 MG tablet TAKE 1 TABLET BY MOUTH EVERY DAY FOR CHOLESTEROL 30 tablet 3     No current facility-administered medications for this visit.     Past Medical History:   Diagnosis Date    Arthritis     Cancer     SKIN CANCER    COPD (chronic obstructive pulmonary disease)     Heart disease     Hyperlipidemia     Hypertension     Myocardial infarction     Stroke      Past Surgical History:   Procedure Laterality Date    CARDIAC CATHETERIZATION N/A 1/13/2020    Procedure: Left Heart Cath;  Surgeon: Siddhartha Butler MD;  Location: [facility] CATH INVASIVE LOCATION;  Service: Cardiology    SKIN CANCER EXCISION      left elbow  "      Pertinent Review of Systems    Objective   /72 (BP Location: Right arm)   Ht 157.5 cm (62\")   Wt 80.3 kg (177 lb)   BMI 32.37 kg/m²    Physical Exam  Well-developed well-nourished male  Skin: In the right lateral face at the site of prior scar there does appear to be a slightly raised area as well as a 1 mm ulcerated area.  This is suspicious for residual disease and measures 1 x 0.5 mm.  The other 2 questionable areas are not well appreciated on today's examination.  Procedures     Results/Data:      Assessment & Plan   Probable residual/recurrent basal cell carcinoma of the right face    Patient to return to office for reexcision.       Discussion/Summary:    Time spent:              Future Appointments   Date Time Provider Department Center   11/13/2023  4:40 PM Joann Piper MD [facility]   3/27/2024  3:30 PM Milton Ruelas, [facility]       Please note that portions of this note were completed with a voice recognition program.  "

## 2024-01-08 ENCOUNTER — PROCEDURE VISIT (OUTPATIENT)
Dept: SURGERY | Facility: CLINIC | Age: 59
End: 2024-01-08
Payer: COMMERCIAL

## 2024-01-08 VITALS
WEIGHT: 176 LBS | BODY MASS INDEX: 32.39 KG/M2 | DIASTOLIC BLOOD PRESSURE: 72 MMHG | HEIGHT: 62 IN | SYSTOLIC BLOOD PRESSURE: 122 MMHG

## 2024-01-08 DIAGNOSIS — Z51.89 VISIT FOR WOUND CHECK: ICD-10-CM

## 2024-01-08 DIAGNOSIS — C44.92 SQUAMOUS CELL CARCINOMA OF SKIN: Primary | ICD-10-CM

## 2024-01-08 NOTE — PROGRESS NOTES
Subjective   Asif Morin is a 58 y.o. male is here today for follow-up.    History of Present Illness  Mr. Morin was seen in the office today for wound check and possible reexcision of a squamous cell carcinoma on his right face.  Lateral margin was positive with his initial excision.  The patient was seen and October and felt that there was a new raised area on the right side of the face.   the patient was therefore scheduled for reexcision.  Due to scheduling conflicts he was not able to return until today.  He states he can no longer feel the raised area.  Allergies   Allergen Reactions    Bee Venom Swelling and Rash       Current Outpatient Medications   Medication Sig Dispense Refill    ALBUTEROL IN Inhale As Needed.      EPINEPHrine (EPIPEN) 0.3 MG/0.3ML solution auto-injector injection       ibuprofen (ADVIL,MOTRIN) 800 MG tablet Take 1 tablet by mouth every 6 (six) hours as needed for mild pain (1-3). 30 tablet 0    lisinopril (PRINIVIL,ZESTRIL) 30 MG tablet Take 1 tablet by mouth Daily.      metoprolol tartrate (LOPRESSOR) 25 MG tablet Take 1 tablet by mouth 2 (Two) Times a Day.      nitroglycerin (NITROSTAT) 0.4 MG SL tablet PLACE 1 TABLET UNDER THE TONGUE EVERY 5 MINUTES UP TO 3 TABLETS IN 15 MINUTES FOR CHEST PAIN. IF NO RELIEF GO TO THE EMERGENCY ROOM OR CALL 911 25 tablet 0    omeprazole (priLOSEC) 40 MG capsule Take 1 capsule by mouth Daily.      rosuvastatin (CRESTOR) 40 MG tablet TAKE 1 TABLET BY MOUTH EVERY DAY FOR CHOLESTEROL 30 tablet 3     No current facility-administered medications for this visit.     Past Medical History:   Diagnosis Date    Arthritis     Cancer     SKIN CANCER    COPD (chronic obstructive pulmonary disease)     Heart disease     Hyperlipidemia     Hypertension     Myocardial infarction     Stroke      Past Surgical History:   Procedure Laterality Date    CARDIAC CATHETERIZATION N/A 1/13/2020    Procedure: Left Heart Cath;  Surgeon: Siddhartha Butler MD;  Location:  " [facility] CATH INVASIVE LOCATION;  Service: Cardiology    SKIN CANCER EXCISION      left elbow       Pertinent Review of Systems    Objective   /72   Ht 157.5 cm (62\")   Wt 79.8 kg (176 lb)   BMI 32.19 kg/m²    Physical Exam  Well-developed well-nourished male  On the right side of the face just anterior to the temple there is a healed surgical scar.  The previously noted small ulceration and raised area which was present in October is not appreciated on today's examination.    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     Because of the positive lateral margin cryo pen was performed today even though the raised area has improved.  Patient to follow-up as needed       Discussion/Summary:    Time spent:     BMI is >= 30 and <35. (Class 1 Obesity). The following options were offered after discussion;: weight loss educational material (shared in after visit summary)         Future Appointments   Date Time Provider Department Center   3/27/2024  3:30 PM Milton Ruelas, [facility]       Please note that portions of this note were completed with a voice recognition program.  "

## 2024-01-13 PROBLEM — C44.92 SQUAMOUS CELL CARCINOMA OF SKIN: Status: ACTIVE | Noted: 2024-01-13

## 2024-03-25 ENCOUNTER — TELEPHONE (OUTPATIENT)
Dept: CARDIOLOGY | Facility: CLINIC | Age: 59
End: 2024-03-25
Payer: COMMERCIAL

## 2024-04-02 RX ORDER — ROSUVASTATIN CALCIUM 40 MG/1
40 TABLET, COATED ORAL DAILY
Qty: 30 TABLET | Refills: 0 | Status: SHIPPED | OUTPATIENT
Start: 2024-04-02

## 2024-04-18 ENCOUNTER — OFFICE VISIT (OUTPATIENT)
Dept: CARDIOLOGY | Facility: CLINIC | Age: 59
End: 2024-04-18
Payer: COMMERCIAL

## 2024-04-18 VITALS
SYSTOLIC BLOOD PRESSURE: 114 MMHG | WEIGHT: 175 LBS | OXYGEN SATURATION: 97 % | HEART RATE: 65 BPM | HEIGHT: 62 IN | BODY MASS INDEX: 32.2 KG/M2 | DIASTOLIC BLOOD PRESSURE: 69 MMHG

## 2024-04-18 DIAGNOSIS — E78.5 DYSLIPIDEMIA: ICD-10-CM

## 2024-04-18 DIAGNOSIS — I10 ESSENTIAL HYPERTENSION: Primary | ICD-10-CM

## 2024-04-18 PROCEDURE — 3074F SYST BP LT 130 MM HG: CPT | Performed by: PHYSICIAN ASSISTANT

## 2024-04-18 PROCEDURE — 99213 OFFICE O/P EST LOW 20 MIN: CPT | Performed by: PHYSICIAN ASSISTANT

## 2024-04-18 PROCEDURE — 3078F DIAST BP <80 MM HG: CPT | Performed by: PHYSICIAN ASSISTANT

## 2024-04-18 NOTE — PROGRESS NOTES
Wandy Fragoso, DO  Asif Morin  1965  04/18/2024    Patient Active Problem List   Diagnosis    Essential hypertension    Dyslipidemia    Precordial pain    Abnormal nuclear stress test    Angina, class III    Sebaceous cyst    Skin cancer    Leg pain, posterior, left    Left leg swelling    Neoplasm of uncertain behavior of skin    Squamous cell carcinoma of skin       Dear Wandy Fragoso, DO:    Subjective     History of Present Illness:    Chief Complaint   Patient presents with    Follow-up     ROUTINE       Asif Morin is a pleasant 58 y.o. male with a past medical history significant for chronic chest pains with normal LHC on 1/13/2020. He does have history of GERD, essential hypertension, and dyslipidemia. He comes in for routine cardiology follow up.     Asif comes in today for routine follow-up and denies any ongoing issues that has been concerning him.  Upon further questioning he denies any shortness of breath, palpitations, or syncope.  He still does endorse chest pains which have been chronic thankfully they are very infrequent occurring less than once monthly.  These have been ongoing for many years Asif has had a left heart catheterization in 2020 which was unremarkable.    Allergies   Allergen Reactions    Bee Venom Swelling and Rash   :      Current Outpatient Medications:     ALBUTEROL IN, Inhale As Needed., Disp: , Rfl:     EPINEPHrine (EPIPEN) 0.3 MG/0.3ML solution auto-injector injection, , Disp: , Rfl:     lisinopril (PRINIVIL,ZESTRIL) 30 MG tablet, Take 1 tablet by mouth Daily., Disp: , Rfl:     metoprolol tartrate (LOPRESSOR) 25 MG tablet, Take 1 tablet by mouth 2 (Two) Times a Day., Disp: , Rfl:     nitroglycerin (NITROSTAT) 0.4 MG SL tablet, PLACE 1 TABLET UNDER THE TONGUE EVERY 5 MINUTES UP TO 3 TABLETS IN 15 MINUTES FOR CHEST PAIN. IF NO RELIEF GO TO THE EMERGENCY ROOM OR CALL 911, Disp: 25 tablet, Rfl: 0    omeprazole (priLOSEC) 40 MG capsule, Take 1 capsule  "by mouth Daily., Disp: , Rfl:     rosuvastatin (CRESTOR) 40 MG tablet, TAKE ONE TABLET BY MOUTH EVERY DAY FOR CHOLESTEROL, Disp: 30 tablet, Rfl: 0    ibuprofen (ADVIL,MOTRIN) 800 MG tablet, Take 1 tablet by mouth every 6 (six) hours as needed for mild pain (1-3). (Patient not taking: Reported on 4/18/2024), Disp: 30 tablet, Rfl: 0    The following portions of the patient's history were reviewed and updated as appropriate: allergies, current medications, past family history, past medical history, past social history, past surgical history and problem list.    Social History     Tobacco Use    Smoking status: Former     Current packs/day: 1.00     Types: Cigarettes    Smokeless tobacco: Current     Types: Snuff   Vaping Use    Vaping status: Never Used   Substance Use Topics    Alcohol use: No    Drug use: No         Objective   Vitals:    04/18/24 1439   BP: 114/69   Pulse: 65   SpO2: 97%   Weight: 79.4 kg (175 lb)   Height: 157.5 cm (62\")     Body mass index is 32.01 kg/m².    ROS    Constitutional:       General: Not in acute distress.     Appearance: Healthy appearance. Well-developed and not in distress. Not diaphoretic.   Eyes:      Conjunctiva/sclera: Conjunctivae normal.      Pupils: Pupils are equal, round, and reactive to light.   HENT:      Head: Normocephalic and atraumatic.   Neck:      Vascular: No carotid bruit or JVD.   Pulmonary:      Effort: Pulmonary effort is normal. No respiratory distress.      Breath sounds: Normal breath sounds.   Cardiovascular:      Normal rate. Regular rhythm.   Edema:     Peripheral edema absent.   Skin:     General: Skin is cool.   Neurological:      Mental Status: Alert, oriented to person, place, and time and oriented to person, place and time.         Lab Results   Component Value Date     10/07/2022    K 4.2 10/07/2022     10/07/2022    CO2 24.6 10/07/2022    BUN 13 10/07/2022    CREATININE 1.42 (H) 10/07/2022    GLUCOSE 90 10/07/2022    CALCIUM 9.9 " "10/07/2022    AST 24 10/07/2022    ALT 25 10/07/2022    ALKPHOS 90 10/07/2022    LABIL2 1.4 (L) 05/16/2015     No results found for: \"CKTOTAL\"  Lab Results   Component Value Date    WBC 7.06 10/07/2022    HGB 15.7 10/07/2022    HCT 48.0 10/07/2022     10/07/2022     Lab Results   Component Value Date    INR 0.95 10/07/2022    INR 0.93 01/10/2020    INR 0.92 01/26/2017     No results found for: \"MG\"  Lab Results   Component Value Date    TSH 2.913 08/03/2018    PSA 0.430 08/03/2018    CHLPL 207 (H) 05/16/2015    TRIG 156 (H) 04/19/2022    HDL 40 04/19/2022     (H) 04/19/2022      No results found for: \"BNP\"    During this visit the following were done:  Labs Reviewed []    Labs Ordered []    Radiology Reports Reviewed []    Radiology Ordered []    PCP Records Reviewed []    Referring Provider Records Reviewed []    ER Records Reviewed []    Hospital Records Reviewed []    History Obtained From Family []    Radiology Images Reviewed []    Other Reviewed []    Records Requested []       Procedures    Assessment & Plan    Diagnosis Plan   1. Essential hypertension        2. Dyslipidemia                 Recommendations:  Essential hypertension  Well-controlled  Chronic precordial pain  Symptoms very infrequent, less than once monthly.  Will continue with GDMT with Crestor, metoprolol, lisinopril.    No follow-ups on file.    As always, I appreciate very much the opportunity to participate in the cardiovascular care of your patients.      With Best Regards,    Milton Ruelas PA-C          "

## 2024-05-06 ENCOUNTER — OFFICE VISIT (OUTPATIENT)
Dept: SURGERY | Facility: CLINIC | Age: 59
End: 2024-05-06
Payer: COMMERCIAL

## 2024-05-06 VITALS
SYSTOLIC BLOOD PRESSURE: 102 MMHG | WEIGHT: 172.8 LBS | HEART RATE: 64 BPM | HEIGHT: 62 IN | BODY MASS INDEX: 31.8 KG/M2 | DIASTOLIC BLOOD PRESSURE: 68 MMHG

## 2024-05-06 DIAGNOSIS — D48.5 NEOPLASM OF UNCERTAIN BEHAVIOR OF SKIN: Primary | ICD-10-CM

## 2024-05-06 PROCEDURE — 1160F RVW MEDS BY RX/DR IN RCRD: CPT | Performed by: SURGERY

## 2024-05-06 PROCEDURE — 99212 OFFICE O/P EST SF 10 MIN: CPT | Performed by: SURGERY

## 2024-05-06 PROCEDURE — 1159F MED LIST DOCD IN RCRD: CPT | Performed by: SURGERY

## 2024-05-06 PROCEDURE — 3078F DIAST BP <80 MM HG: CPT | Performed by: SURGERY

## 2024-05-06 PROCEDURE — 3074F SYST BP LT 130 MM HG: CPT | Performed by: SURGERY

## 2024-05-06 NOTE — PROGRESS NOTES
"Subjective   Asif Rufino Morin is a 58 y.o. male here today to have another place on face checked.    History of Present Illness  Mr. Morin was seen in the office today for a new skin lesion in the left preauricular area.  He has a history of basal cell carcinoma in situ on the right side of the face.  Allergies   Allergen Reactions    Bee Venom Swelling and Rash       Current Outpatient Medications   Medication Sig Dispense Refill    ALBUTEROL IN Inhale As Needed.      EPINEPHrine (EPIPEN) 0.3 MG/0.3ML solution auto-injector injection       ibuprofen (ADVIL,MOTRIN) 800 MG tablet Take 1 tablet by mouth every 6 (six) hours as needed for mild pain (1-3). 30 tablet 0    lisinopril (PRINIVIL,ZESTRIL) 30 MG tablet Take 1 tablet by mouth Daily.      metoprolol tartrate (LOPRESSOR) 25 MG tablet Take 1 tablet by mouth 2 (Two) Times a Day.      nitroglycerin (NITROSTAT) 0.4 MG SL tablet PLACE 1 TABLET UNDER THE TONGUE EVERY 5 MINUTES UP TO 3 TABLETS IN 15 MINUTES FOR CHEST PAIN. IF NO RELIEF GO TO THE EMERGENCY ROOM OR CALL 911 25 tablet 0    omeprazole (priLOSEC) 40 MG capsule Take 1 capsule by mouth Daily.      rosuvastatin (CRESTOR) 40 MG tablet TAKE ONE TABLET BY MOUTH EVERY DAY FOR CHOLESTEROL 30 tablet 0     No current facility-administered medications for this visit.     Past Medical History:   Diagnosis Date    Arthritis     Cancer     SKIN CANCER    COPD (chronic obstructive pulmonary disease)     Heart disease     Hyperlipidemia     Hypertension     Myocardial infarction     Stroke      Past Surgical History:   Procedure Laterality Date    CARDIAC CATHETERIZATION N/A 1/13/2020    Procedure: Left Heart Cath;  Surgeon: Siddhartha Butler MD;  Location: [facility] CATH INVASIVE LOCATION;  Service: Cardiology    SKIN CANCER EXCISION      left elbow       Pertinent Review of Systems      Objective   /68 (BP Location: Left arm)   Pulse 64   Ht 157.5 cm (62\")   Wt 78.4 kg (172 lb 12.8 oz)   BMI 31.61 kg/m²  "   Physical Exam  Skin: In the left preauricular area there is a 4 mm raised irregular area suspicious for basal/squamous skin cancer.  Site of right facial lesion excision demonstrates no evidence of recurrence  Procedures     Results/Data:      Assessment & Plan   Skin lesion of uncertain behavior left preauricular area    Patient will be given a follow-up appointment to return to the office for excision once authorization has been obtained       Discussion/Summary:    Time spent:     BMI is >= 30 and <35. (Class 1 Obesity). The following options were offered after discussion;: nutrition counseling/recommendations         Future Appointments   Date Time Provider Department Center   5/6/2024  3:30 PM Joann Piper MD [facility]   10/18/2024  8:30 AM Milton Ruelas, [facility]       Please note that portions of this note were completed with a voice recognition program.

## 2024-05-20 RX ORDER — ROSUVASTATIN CALCIUM 40 MG/1
40 TABLET, COATED ORAL DAILY
Qty: 30 TABLET | Refills: 0 | Status: SHIPPED | OUTPATIENT
Start: 2024-05-20

## 2024-06-06 ENCOUNTER — PROCEDURE VISIT (OUTPATIENT)
Dept: SURGERY | Facility: CLINIC | Age: 59
End: 2024-06-06
Payer: COMMERCIAL

## 2024-06-06 VITALS
WEIGHT: 172.4 LBS | HEIGHT: 62 IN | BODY MASS INDEX: 31.73 KG/M2 | DIASTOLIC BLOOD PRESSURE: 68 MMHG | SYSTOLIC BLOOD PRESSURE: 108 MMHG | HEART RATE: 60 BPM

## 2024-06-06 DIAGNOSIS — D48.5 NEOPLASM OF UNCERTAIN BEHAVIOR OF SKIN: Primary | ICD-10-CM

## 2024-06-06 DIAGNOSIS — D48.5 NEOPLASM OF UNCERTAIN BEHAVIOR OF SKIN: ICD-10-CM

## 2024-06-06 NOTE — PROGRESS NOTES
"Subjective   Asif Rufino Morin is a 58 y.o. male here today to have spot on left side of face removed.    History of Present Illness  Mr. 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  Allergies   Allergen Reactions    Bee Venom Swelling and Rash       Current Outpatient Medications   Medication Sig Dispense Refill    ALBUTEROL IN Inhale As Needed.      EPINEPHrine (EPIPEN) 0.3 MG/0.3ML solution auto-injector injection       ibuprofen (ADVIL,MOTRIN) 800 MG tablet Take 1 tablet by mouth every 6 (six) hours as needed for mild pain (1-3). 30 tablet 0    lisinopril (PRINIVIL,ZESTRIL) 30 MG tablet Take 1 tablet by mouth Daily.      metoprolol tartrate (LOPRESSOR) 25 MG tablet Take 1 tablet by mouth 2 (Two) Times a Day.      nitroglycerin (NITROSTAT) 0.4 MG SL tablet PLACE 1 TABLET UNDER THE TONGUE EVERY 5 MINUTES UP TO 3 TABLETS IN 15 MINUTES FOR CHEST PAIN. IF NO RELIEF GO TO THE EMERGENCY ROOM OR CALL 911 25 tablet 0    omeprazole (priLOSEC) 40 MG capsule Take 1 capsule by mouth Daily.      rosuvastatin (CRESTOR) 40 MG tablet TAKE ONE TABLET BY MOUTH EVERY DAY FOR CHOLESTEROL 30 tablet 0     No current facility-administered medications for this visit.     Past Medical History:   Diagnosis Date    Arthritis     Cancer     SKIN CANCER    COPD (chronic obstructive pulmonary disease)     Heart disease     Hyperlipidemia     Hypertension     Myocardial infarction     Stroke      Past Surgical History:   Procedure Laterality Date    CARDIAC CATHETERIZATION N/A 1/13/2020    Procedure: Left Heart Cath;  Surgeon: Siddhartha Butler MD;  Location: [facility] CATH INVASIVE LOCATION;  Service: Cardiology    SKIN CANCER EXCISION      left elbow       Pertinent Review of Systems      Objective   /68 (BP Location: Left arm)   Pulse 60   Ht 157.5 cm (62\")   Wt 78.2 kg (172 lb 6.4 oz)   BMI 31.53 kg/m²    Physical Exam  Small raised irregular skin lesion in the " left preauricular area of the uncertain behavior    Procedures   Procedure Note    Procedure: Excision neoplasm of uncertain behavior    Location: Left preauricular area    Anesthesia: 1% lidocaine with epinephrine    Description:  The risks and benefits of the procedure were discussed.  After prep with Betadine and infiltration with lidocaine the lesion was excised to grossly clear margins using the 4 mm punch device.  Incision was then closed with a running 4-0 Monocryl suture.  Incision length was 5 mm.    Complications:  none    Follow-up: Pending pathology  Results/Data:      Assessment & Plan   Status post excision of a neoplasm of uncertain behavior left preauricular area    Pathology will be tracked and additional recommendations given       Discussion/Summary:    Time spent:     BMI is >= 30 and <35. (Class 1 Obesity). The following options were offered after discussion;: exercise counseling/recommendations         Future Appointments   Date Time Provider Department Center   10/18/2024  8:30 AM Milton Ruelas PA-C [facility]           This document has been electronically signed by Angely Magaña MA   June 6, 2024 11:51 EDT    Please note that portions of this note were completed with a voice recognition program.

## 2024-06-07 LAB — REF LAB TEST METHOD: NORMAL

## 2024-06-17 ENCOUNTER — TELEPHONE (OUTPATIENT)
Dept: SURGERY | Facility: CLINIC | Age: 59
End: 2024-06-17
Payer: COMMERCIAL

## 2024-06-26 RX ORDER — ROSUVASTATIN CALCIUM 40 MG/1
40 TABLET, COATED ORAL DAILY
Qty: 30 TABLET | Refills: 0 | Status: SHIPPED | OUTPATIENT
Start: 2024-06-26

## 2024-08-27 RX ORDER — ROSUVASTATIN CALCIUM 40 MG/1
40 TABLET, COATED ORAL DAILY
Qty: 30 TABLET | Refills: 0 | Status: SHIPPED | OUTPATIENT
Start: 2024-08-27

## 2024-09-23 RX ORDER — ROSUVASTATIN CALCIUM 40 MG/1
40 TABLET, COATED ORAL DAILY
Qty: 30 TABLET | Refills: 0 | Status: SHIPPED | OUTPATIENT
Start: 2024-09-23

## 2024-10-03 ENCOUNTER — OFFICE VISIT (OUTPATIENT)
Dept: SURGERY | Facility: CLINIC | Age: 59
End: 2024-10-03
Payer: COMMERCIAL

## 2024-10-03 VITALS
BODY MASS INDEX: 31.43 KG/M2 | WEIGHT: 170.8 LBS | SYSTOLIC BLOOD PRESSURE: 126 MMHG | HEART RATE: 56 BPM | DIASTOLIC BLOOD PRESSURE: 84 MMHG | HEIGHT: 62 IN

## 2024-10-03 DIAGNOSIS — D48.5 NEOPLASM OF UNCERTAIN BEHAVIOR OF SKIN: Primary | ICD-10-CM

## 2024-10-03 PROCEDURE — 3079F DIAST BP 80-89 MM HG: CPT | Performed by: SURGERY

## 2024-10-03 PROCEDURE — 1159F MED LIST DOCD IN RCRD: CPT | Performed by: SURGERY

## 2024-10-03 PROCEDURE — 99212 OFFICE O/P EST SF 10 MIN: CPT | Performed by: SURGERY

## 2024-10-03 PROCEDURE — 3074F SYST BP LT 130 MM HG: CPT | Performed by: SURGERY

## 2024-10-03 PROCEDURE — 1160F RVW MEDS BY RX/DR IN RCRD: CPT | Performed by: SURGERY

## 2024-10-03 NOTE — PROGRESS NOTES
"Subjective   Asif Rufino Morin is a 58 y.o. male here today  to have a place on his face checked.    History of Present Illness  Mr. Morin was seen in the office today for evaluation of a new lesion on the left pinna of the ear.  He states that this been there for a while but just recently has gotten sore.  Allergies   Allergen Reactions    Bee Venom Swelling and Rash       Current Outpatient Medications   Medication Sig Dispense Refill    ALBUTEROL IN Inhale As Needed.      EPINEPHrine (EPIPEN) 0.3 MG/0.3ML solution auto-injector injection       ibuprofen (ADVIL,MOTRIN) 800 MG tablet Take 1 tablet by mouth every 6 (six) hours as needed for mild pain (1-3). 30 tablet 0    lisinopril (PRINIVIL,ZESTRIL) 30 MG tablet Take 1 tablet by mouth Daily.      metoprolol tartrate (LOPRESSOR) 25 MG tablet Take 1 tablet by mouth 2 (Two) Times a Day.      nitroglycerin (NITROSTAT) 0.4 MG SL tablet PLACE 1 TABLET UNDER THE TONGUE EVERY 5 MINUTES UP TO 3 TABLETS IN 15 MINUTES FOR CHEST PAIN. IF NO RELIEF GO TO THE EMERGENCY ROOM OR CALL 911 25 tablet 0    omeprazole (priLOSEC) 40 MG capsule Take 1 capsule by mouth Daily.      rosuvastatin (CRESTOR) 40 MG tablet TAKE ONE TABLET BY MOUTH EVERY DAY FOR CHOLESTEROL 30 tablet 0     No current facility-administered medications for this visit.     Past Medical History:   Diagnosis Date    Arthritis     Cancer     SKIN CANCER    COPD (chronic obstructive pulmonary disease)     Heart disease     Hyperlipidemia     Hypertension     Myocardial infarction     Stroke      Past Surgical History:   Procedure Laterality Date    CARDIAC CATHETERIZATION N/A 1/13/2020    Procedure: Left Heart Cath;  Surgeon: Siddhartha Butler MD;  Location: [facility] CATH INVASIVE LOCATION;  Service: Cardiology    SKIN CANCER EXCISION      left elbow       Pertinent Review of Systems      Objective   /84 (BP Location: Left arm)   Pulse 56   Ht 157.5 cm (62\")   Wt 77.5 kg (170 lb 12.8 oz)   BMI 31.24 kg/m²  "   Physical Exam  Well-developed well-nourished male  The skin of the outer aspect of the left ear there is a slightly raised 5 mm lesion suggestive of a possible squamous/basal cell carcinoma.  Greatest dimension is 5 mm  Procedures     Results/Data:    Assessment & Plan   Neoplasm of uncertain behavior but suspicious for basal cell carcinoma of the outer left ear.    Excision in this location would be difficult due to the fairly strong possibility of having exposed cartilage.    Patient would be best served by cryo pen therapy rather than surgical excision.       Discussion/Summary: Advised patient that we would contact him to schedule treatment once we got our new cryo pen machine working    Time spent:     BMI is >= 30 and <35. (Class 1 Obesity). The following options were offered after discussion;: exercise counseling/recommendations       @[email or web address]    This document has been electronically signed by        October 3, 2024 10:00 EDT    Please note that portions of this note were completed with a voice recognition program.

## 2024-10-18 ENCOUNTER — OFFICE VISIT (OUTPATIENT)
Dept: CARDIOLOGY | Facility: CLINIC | Age: 59
End: 2024-10-18
Payer: COMMERCIAL

## 2024-10-18 VITALS
SYSTOLIC BLOOD PRESSURE: 141 MMHG | HEIGHT: 62 IN | BODY MASS INDEX: 32.24 KG/M2 | DIASTOLIC BLOOD PRESSURE: 89 MMHG | HEART RATE: 59 BPM | OXYGEN SATURATION: 97 % | WEIGHT: 175.2 LBS

## 2024-10-18 DIAGNOSIS — I10 ESSENTIAL HYPERTENSION: Primary | ICD-10-CM

## 2024-10-18 DIAGNOSIS — E78.5 DYSLIPIDEMIA: ICD-10-CM

## 2024-10-18 PROBLEM — I20.9 ANGINA, CLASS III: Status: RESOLVED | Noted: 2020-01-10 | Resolved: 2024-10-18

## 2024-10-18 PROBLEM — R94.39 ABNORMAL NUCLEAR STRESS TEST: Status: RESOLVED | Noted: 2019-12-19 | Resolved: 2024-10-18

## 2024-10-18 PROBLEM — R07.2 PRECORDIAL PAIN: Status: RESOLVED | Noted: 2019-12-19 | Resolved: 2024-10-18

## 2024-10-18 PROCEDURE — 99214 OFFICE O/P EST MOD 30 MIN: CPT | Performed by: PHYSICIAN ASSISTANT

## 2024-10-18 PROCEDURE — 93000 ELECTROCARDIOGRAM COMPLETE: CPT | Performed by: PHYSICIAN ASSISTANT

## 2024-10-18 PROCEDURE — 3077F SYST BP >= 140 MM HG: CPT | Performed by: PHYSICIAN ASSISTANT

## 2024-10-18 PROCEDURE — 3079F DIAST BP 80-89 MM HG: CPT | Performed by: PHYSICIAN ASSISTANT

## 2024-10-18 RX ORDER — ROSUVASTATIN CALCIUM 40 MG/1
40 TABLET, COATED ORAL DAILY
Qty: 90 TABLET | Refills: 3 | Status: SHIPPED | OUTPATIENT
Start: 2024-10-18

## 2024-10-18 RX ORDER — METOPROLOL TARTRATE 25 MG/1
25 TABLET, FILM COATED ORAL 2 TIMES DAILY
Qty: 180 TABLET | Refills: 2 | Status: SHIPPED | OUTPATIENT
Start: 2024-10-18

## 2024-10-18 RX ORDER — LISINOPRIL 30 MG/1
30 TABLET ORAL DAILY
Qty: 90 TABLET | Refills: 2 | Status: SHIPPED | OUTPATIENT
Start: 2024-10-18

## 2024-10-18 NOTE — PROGRESS NOTES
Wandy Fragoso, DO  Asif Morin  1965  10/18/2024    Patient Active Problem List   Diagnosis    Essential hypertension    Dyslipidemia    Sebaceous cyst    Skin cancer    Leg pain, posterior, left    Left leg swelling    Neoplasm of uncertain behavior of skin    Squamous cell carcinoma of skin       Dear Wandy Fragoso DO:    Subjective     Follow-up  :    Chief Complaint   Patient presents with    Follow-up     ROUTINE       Asif Morin is a pleasant 59 y.o. male with a past medical history significant for chronic chest pains with normal LHC on 1/13/2020. He does have history of GERD, essential hypertension, and dyslipidemia. He comes in for routine cardiology follow up.     Patient denies any chest pain, shortness of breath, palpitations, dizziness, syncope or near syncope.  Blood pressure is borderline elevated but has been well-controlled otherwise.    Allergies   Allergen Reactions    Bee Venom Swelling and Rash   :      Current Outpatient Medications:     ALBUTEROL IN, Inhale As Needed., Disp: , Rfl:     EPINEPHrine (EPIPEN) 0.3 MG/0.3ML solution auto-injector injection, , Disp: , Rfl:     ibuprofen (ADVIL,MOTRIN) 800 MG tablet, Take 1 tablet by mouth every 6 (six) hours as needed for mild pain (1-3)., Disp: 30 tablet, Rfl: 0    lisinopril (PRINIVIL,ZESTRIL) 30 MG tablet, Take 1 tablet by mouth Daily., Disp: 90 tablet, Rfl: 2    metoprolol tartrate (LOPRESSOR) 25 MG tablet, Take 1 tablet by mouth 2 (Two) Times a Day., Disp: 180 tablet, Rfl: 2    nitroglycerin (NITROSTAT) 0.4 MG SL tablet, PLACE 1 TABLET UNDER THE TONGUE EVERY 5 MINUTES UP TO 3 TABLETS IN 15 MINUTES FOR CHEST PAIN. IF NO RELIEF GO TO THE EMERGENCY ROOM OR CALL 911, Disp: 25 tablet, Rfl: 0    omeprazole (priLOSEC) 40 MG capsule, Take 1 capsule by mouth Daily., Disp: , Rfl:     rosuvastatin (CRESTOR) 40 MG tablet, Take 1 tablet by mouth Daily. for cholesterol, Disp: 90 tablet, Rfl: 3    The following portions of the  "patient's history were reviewed and updated as appropriate: allergies, current medications, past family history, past medical history, past social history, past surgical history and problem list.    Social History     Tobacco Use    Smoking status: Former     Current packs/day: 1.00     Types: Cigarettes    Smokeless tobacco: Current     Types: Snuff   Vaping Use    Vaping status: Never Used   Substance Use Topics    Alcohol use: No    Drug use: No         Objective   Vitals:    10/18/24 0822   BP: 141/89   Pulse: 59   SpO2: 97%   Weight: 79.5 kg (175 lb 3.2 oz)   Height: 157.5 cm (62\")     Body mass index is 32.04 kg/m².    ROS    Constitutional:       General: Not in acute distress.     Appearance: Healthy appearance. Well-developed and not in distress. Not diaphoretic.   Eyes:      Conjunctiva/sclera: Conjunctivae normal.      Pupils: Pupils are equal, round, and reactive to light.   HENT:      Head: Normocephalic and atraumatic.   Neck:      Vascular: No carotid bruit or JVD.   Pulmonary:      Effort: Pulmonary effort is normal. No respiratory distress.      Breath sounds: Normal breath sounds.   Cardiovascular:      Normal rate. Regular rhythm.   Edema:     Peripheral edema absent.   Skin:     General: Skin is cool.   Neurological:      Mental Status: Alert, oriented to person, place, and time and oriented to person, place and time.         Lab Results   Component Value Date     10/07/2022    K 4.2 10/07/2022     10/07/2022    CO2 24.6 10/07/2022    BUN 13 10/07/2022    CREATININE 1.42 (H) 10/07/2022    GLUCOSE 90 10/07/2022    CALCIUM 9.9 10/07/2022    AST 24 10/07/2022    ALT 25 10/07/2022    ALKPHOS 90 10/07/2022    LABIL2 1.4 (L) 05/16/2015     No results found for: \"CKTOTAL\"  Lab Results   Component Value Date    WBC 7.06 10/07/2022    HGB 15.7 10/07/2022    HCT 48.0 10/07/2022     10/07/2022     Lab Results   Component Value Date    INR 0.95 10/07/2022    INR 0.93 01/10/2020    INR 0.92 " "01/26/2017     No results found for: \"MG\"  Lab Results   Component Value Date    TSH 2.913 08/03/2018    PSA 0.430 08/03/2018    CHLPL 207 (H) 05/16/2015    TRIG 156 (H) 04/19/2022    HDL 40 04/19/2022     (H) 04/19/2022      No results found for: \"BNP\"    During this visit the following were done:  Labs Reviewed []    Labs Ordered []    Radiology Reports Reviewed []    Radiology Ordered []    PCP Records Reviewed []    Referring Provider Records Reviewed []    ER Records Reviewed []    Hospital Records Reviewed []    History Obtained From Family []    Radiology Images Reviewed []    Other Reviewed []    Records Requested []         ECG 12 Lead    Date/Time: 10/18/2024 8:23 AM  Performed by: Milton Ruelas PA-C    Authorized by: Milton Ruelas PA-C  Comparison: compared with previous ECG   Similar to previous ECG  Rhythm: sinus rhythm  ST Segments: ST segments normal    Clinical impression: normal ECG        Assessment & Plan    Diagnosis Plan   1. Essential hypertension  ECG 12 Lead    Comprehensive Metabolic Panel    CBC & Differential    Lipid Panel      2. Dyslipidemia  Comprehensive Metabolic Panel    CBC & Differential    Lipid Panel               Recommendations:  Essential hypertension  Borderline elevated today but otherwise has been controlled last several visits so we will monitor for now however if persistently elevated may adjust antihypertensive therapy.  I encouraged him to start checking blood pressure regularly at home.  Dyslipidemia  On Crestor.  I did order lipid panel, CBC, and CMP    No follow-ups on file.    As always, I appreciate very much the opportunity to participate in the cardiovascular care of your patients.      With Best Regards,    Milton Ruelas PA-C          "

## 2024-10-28 ENCOUNTER — LAB (OUTPATIENT)
Dept: LAB | Facility: HOSPITAL | Age: 59
End: 2024-10-28
Payer: COMMERCIAL

## 2024-10-28 ENCOUNTER — TELEPHONE (OUTPATIENT)
Dept: CARDIOLOGY | Facility: CLINIC | Age: 59
End: 2024-10-28
Payer: COMMERCIAL

## 2024-10-28 DIAGNOSIS — E78.5 DYSLIPIDEMIA: ICD-10-CM

## 2024-10-28 DIAGNOSIS — I10 ESSENTIAL HYPERTENSION: ICD-10-CM

## 2024-10-28 LAB
ALBUMIN SERPL-MCNC: 3.9 G/DL (ref 3.5–5.2)
ALBUMIN/GLOB SERPL: 1.2 G/DL
ALP SERPL-CCNC: 88 U/L (ref 39–117)
ALT SERPL W P-5'-P-CCNC: 19 U/L (ref 1–41)
ANION GAP SERPL CALCULATED.3IONS-SCNC: 9 MMOL/L (ref 5–15)
AST SERPL-CCNC: 19 U/L (ref 1–40)
BASOPHILS # BLD AUTO: 0.05 10*3/MM3 (ref 0–0.2)
BASOPHILS NFR BLD AUTO: 0.8 % (ref 0–1.5)
BILIRUB SERPL-MCNC: 0.2 MG/DL (ref 0–1.2)
BUN SERPL-MCNC: 16 MG/DL (ref 6–20)
BUN/CREAT SERPL: 9.8 (ref 7–25)
CALCIUM SPEC-SCNC: 9.5 MG/DL (ref 8.6–10.5)
CHLORIDE SERPL-SCNC: 106 MMOL/L (ref 98–107)
CHOLEST SERPL-MCNC: 162 MG/DL (ref 0–200)
CO2 SERPL-SCNC: 24 MMOL/L (ref 22–29)
CREAT SERPL-MCNC: 1.64 MG/DL (ref 0.76–1.27)
DEPRECATED RDW RBC AUTO: 41.2 FL (ref 37–54)
EGFRCR SERPLBLD CKD-EPI 2021: 48.2 ML/MIN/1.73
EOSINOPHIL # BLD AUTO: 0.41 10*3/MM3 (ref 0–0.4)
EOSINOPHIL NFR BLD AUTO: 6.3 % (ref 0.3–6.2)
ERYTHROCYTE [DISTWIDTH] IN BLOOD BY AUTOMATED COUNT: 12 % (ref 12.3–15.4)
GLOBULIN UR ELPH-MCNC: 3.3 GM/DL
GLUCOSE SERPL-MCNC: 102 MG/DL (ref 65–99)
HCT VFR BLD AUTO: 41.8 % (ref 37.5–51)
HDLC SERPL-MCNC: 39 MG/DL (ref 40–60)
HGB BLD-MCNC: 14 G/DL (ref 13–17.7)
IMM GRANULOCYTES # BLD AUTO: 0.02 10*3/MM3 (ref 0–0.05)
IMM GRANULOCYTES NFR BLD AUTO: 0.3 % (ref 0–0.5)
LDLC SERPL CALC-MCNC: 101 MG/DL (ref 0–100)
LDLC/HDLC SERPL: 2.54 {RATIO}
LYMPHOCYTES # BLD AUTO: 2.49 10*3/MM3 (ref 0.7–3.1)
LYMPHOCYTES NFR BLD AUTO: 38.4 % (ref 19.6–45.3)
MCH RBC QN AUTO: 31.8 PG (ref 26.6–33)
MCHC RBC AUTO-ENTMCNC: 33.5 G/DL (ref 31.5–35.7)
MCV RBC AUTO: 95 FL (ref 79–97)
MONOCYTES # BLD AUTO: 0.66 10*3/MM3 (ref 0.1–0.9)
MONOCYTES NFR BLD AUTO: 10.2 % (ref 5–12)
NEUTROPHILS NFR BLD AUTO: 2.86 10*3/MM3 (ref 1.7–7)
NEUTROPHILS NFR BLD AUTO: 44 % (ref 42.7–76)
NRBC BLD AUTO-RTO: 0 /100 WBC (ref 0–0.2)
PLATELET # BLD AUTO: 252 10*3/MM3 (ref 140–450)
PMV BLD AUTO: 9.7 FL (ref 6–12)
POTASSIUM SERPL-SCNC: 4.2 MMOL/L (ref 3.5–5.2)
PROT SERPL-MCNC: 7.2 G/DL (ref 6–8.5)
RBC # BLD AUTO: 4.4 10*6/MM3 (ref 4.14–5.8)
SODIUM SERPL-SCNC: 139 MMOL/L (ref 136–145)
TRIGL SERPL-MCNC: 119 MG/DL (ref 0–150)
VLDLC SERPL-MCNC: 22 MG/DL (ref 5–40)
WBC NRBC COR # BLD AUTO: 6.49 10*3/MM3 (ref 3.4–10.8)

## 2024-10-28 PROCEDURE — 36415 COLL VENOUS BLD VENIPUNCTURE: CPT

## 2024-10-28 PROCEDURE — 85025 COMPLETE CBC W/AUTO DIFF WBC: CPT

## 2024-10-28 PROCEDURE — 80053 COMPREHEN METABOLIC PANEL: CPT

## 2024-10-28 PROCEDURE — 80061 LIPID PANEL: CPT

## 2024-10-28 NOTE — TELEPHONE ENCOUNTER
Caller: Asif Morin    Relationship: Self    Best call back number: [phone]    What is the best time to reach you: ANY    Who are you requesting to speak with (clinical staff, provider,  specific staff member): CLINICAL      What was the call regarding: PT IS WONDERING IF СЕРГЕЙ WOULD BE ABLE TO WRITE A DOCUMENT ON HIS BEHALF STATING HE IS NOT FIT FOR JURY DUTY

## 2024-10-28 NOTE — TELEPHONE ENCOUNTER
Hub to relay.     Called pt to advise him that he will need to contact his PCP. The got dropped. Tried calling back and it doesn't ring.

## 2024-10-31 DIAGNOSIS — R79.89 ELEVATED SERUM CREATININE: Primary | ICD-10-CM

## 2024-10-31 RX ORDER — AMLODIPINE BESYLATE 5 MG/1
5 TABLET ORAL DAILY
Qty: 30 TABLET | Refills: 11 | Status: SHIPPED | OUTPATIENT
Start: 2024-10-31

## 2024-11-22 RX ORDER — OMEPRAZOLE 40 MG/1
CAPSULE, DELAYED RELEASE ORAL
Qty: 30 CAPSULE | Refills: 1 | Status: SHIPPED | OUTPATIENT
Start: 2024-11-22

## 2025-02-27 RX ORDER — OMEPRAZOLE 40 MG/1
CAPSULE, DELAYED RELEASE ORAL
Qty: 30 CAPSULE | Refills: 1 | Status: SHIPPED | OUTPATIENT
Start: 2025-02-27

## 2025-04-16 ENCOUNTER — TELEPHONE (OUTPATIENT)
Dept: CARDIOLOGY | Facility: CLINIC | Age: 60
End: 2025-04-16
Payer: MEDICARE

## 2025-05-08 ENCOUNTER — TELEPHONE (OUTPATIENT)
Dept: CARDIOLOGY | Facility: CLINIC | Age: 60
End: 2025-05-08

## 2025-05-08 NOTE — TELEPHONE ENCOUNTER
Caller: Asif Morin    Relationship to patient: Self    Best call back number: [phone]    Patient is needing: PATIENT RECEIVED A LETTER ABOUT MISSING AN APPOINTMENT. HUB DID NOT RES DUE TO [employer] C[employer]. PLEASE CALL THE PATIENT TO DISCUSS.

## (undated) DEVICE — RADIFOCUS OPTITORQUE ANGIOGRAPHIC CATHETER: Brand: OPTITORQUE

## (undated) DEVICE — RUNWAY RADL W/TOP PAD

## (undated) DEVICE — CATH F5 INF JR 4 100CM: Brand: INFINITI

## (undated) DEVICE — DRAPE, RADIAL, STERILE: Brand: MEDLINE

## (undated) DEVICE — Device

## (undated) DEVICE — ST INF PRI SMRTSTE 20DRP 2VLV 24ML 117

## (undated) DEVICE — PK CATH CARD 70

## (undated) DEVICE — TR BAND RADIAL ARTERY COMPRESSION DEVICE: Brand: TR BAND

## (undated) DEVICE — GLIDESHEATH SLENDER STAINLESS STEEL KIT: Brand: GLIDESHEATH SLENDER

## (undated) DEVICE — DRSNG SURESITE WNDW 4X4.5

## (undated) DEVICE — LN INJ CONTRST FLXCIL HP F/M LL 1200PSI10

## (undated) DEVICE — CATH F5 INF JR 3.5 100CM: Brand: INFINITI

## (undated) DEVICE — ST EXT IV SMARTSITE 2VLV SP M LL 5ML IV1

## (undated) DEVICE — CANNULA,OXY,ADULT,SUPER SOFT,W/14'TUB,UC: Brand: MEDLINE INDUSTRIES, INC.

## (undated) DEVICE — ADULT DISPOSABLE SINGLE-PATIENT USE PULSE OXIMETER SENSOR: Brand: NONIN

## (undated) DEVICE — GW INQW FIX/CORE PTFE J/3MM .035 260CM

## (undated) DEVICE — CATH F5 INF JR 5 100CM: Brand: INFINITI

## (undated) DEVICE — CATH F5 INF JL 3.5 100CM: Brand: INFINITI